# Patient Record
Sex: MALE | Race: WHITE | NOT HISPANIC OR LATINO | Employment: OTHER | URBAN - METROPOLITAN AREA
[De-identification: names, ages, dates, MRNs, and addresses within clinical notes are randomized per-mention and may not be internally consistent; named-entity substitution may affect disease eponyms.]

---

## 2023-12-12 ENCOUNTER — HOSPITAL ENCOUNTER (INPATIENT)
Facility: HOSPITAL | Age: 83
LOS: 2 days | Discharge: NURSING, REHAB OR LONG-TERM CARE | DRG: 988 | End: 2023-12-15
Attending: INTERNAL MEDICINE | Admitting: INTERNAL MEDICINE
Payer: MEDICARE

## 2023-12-12 ENCOUNTER — APPOINTMENT (EMERGENCY)
Dept: RADIOLOGY | Facility: HOSPITAL | Age: 83
DRG: 988 | End: 2023-12-12
Payer: MEDICARE

## 2023-12-12 DIAGNOSIS — S61.209A OPEN WOUND OF FINGER, INITIAL ENCOUNTER: Primary | ICD-10-CM

## 2023-12-12 DIAGNOSIS — R09.81 NASAL CONGESTION: ICD-10-CM

## 2023-12-12 DIAGNOSIS — M86.9 OSTEOMYELITIS OF FINGER OF LEFT HAND (HCC): ICD-10-CM

## 2023-12-12 LAB
ALBUMIN SERPL BCP-MCNC: 3.7 G/DL (ref 3.5–5)
ALP SERPL-CCNC: 64 U/L (ref 34–104)
ALT SERPL W P-5'-P-CCNC: 26 U/L (ref 7–52)
ANION GAP SERPL CALCULATED.3IONS-SCNC: 7 MMOL/L
AST SERPL W P-5'-P-CCNC: 26 U/L (ref 13–39)
BASOPHILS # BLD AUTO: 0.03 THOUSANDS/ÂΜL (ref 0–0.1)
BASOPHILS NFR BLD AUTO: 0 % (ref 0–1)
BILIRUB SERPL-MCNC: 0.35 MG/DL (ref 0.2–1)
BUN SERPL-MCNC: 26 MG/DL (ref 5–25)
CALCIUM SERPL-MCNC: 9.5 MG/DL (ref 8.4–10.2)
CHLORIDE SERPL-SCNC: 104 MMOL/L (ref 96–108)
CO2 SERPL-SCNC: 25 MMOL/L (ref 21–32)
CREAT SERPL-MCNC: 1.09 MG/DL (ref 0.6–1.3)
CRP SERPL QL: <1 MG/L
EOSINOPHIL # BLD AUTO: 0.09 THOUSAND/ÂΜL (ref 0–0.61)
EOSINOPHIL NFR BLD AUTO: 1 % (ref 0–6)
ERYTHROCYTE [DISTWIDTH] IN BLOOD BY AUTOMATED COUNT: 13.3 % (ref 11.6–15.1)
ERYTHROCYTE [SEDIMENTATION RATE] IN BLOOD: 29 MM/HOUR (ref 0–19)
GFR SERPL CREATININE-BSD FRML MDRD: 62 ML/MIN/1.73SQ M
GLUCOSE SERPL-MCNC: 96 MG/DL (ref 65–140)
HCT VFR BLD AUTO: 36.2 % (ref 36.5–49.3)
HGB BLD-MCNC: 11.8 G/DL (ref 12–17)
IMM GRANULOCYTES # BLD AUTO: 0.02 THOUSAND/UL (ref 0–0.2)
IMM GRANULOCYTES NFR BLD AUTO: 0 % (ref 0–2)
LYMPHOCYTES # BLD AUTO: 2.27 THOUSANDS/ÂΜL (ref 0.6–4.47)
LYMPHOCYTES NFR BLD AUTO: 30 % (ref 14–44)
MCH RBC QN AUTO: 31.6 PG (ref 26.8–34.3)
MCHC RBC AUTO-ENTMCNC: 32.6 G/DL (ref 31.4–37.4)
MCV RBC AUTO: 97 FL (ref 82–98)
MONOCYTES # BLD AUTO: 0.73 THOUSAND/ÂΜL (ref 0.17–1.22)
MONOCYTES NFR BLD AUTO: 10 % (ref 4–12)
NEUTROPHILS # BLD AUTO: 4.49 THOUSANDS/ÂΜL (ref 1.85–7.62)
NEUTS SEG NFR BLD AUTO: 59 % (ref 43–75)
NRBC BLD AUTO-RTO: 0 /100 WBCS
PLATELET # BLD AUTO: 238 THOUSANDS/UL (ref 149–390)
PMV BLD AUTO: 9.9 FL (ref 8.9–12.7)
POTASSIUM SERPL-SCNC: 4.2 MMOL/L (ref 3.5–5.3)
PROCALCITONIN SERPL-MCNC: <0.05 NG/ML
PROT SERPL-MCNC: 6.6 G/DL (ref 6.4–8.4)
RBC # BLD AUTO: 3.74 MILLION/UL (ref 3.88–5.62)
SODIUM SERPL-SCNC: 136 MMOL/L (ref 135–147)
WBC # BLD AUTO: 7.63 THOUSAND/UL (ref 4.31–10.16)

## 2023-12-12 PROCEDURE — 96365 THER/PROPH/DIAG IV INF INIT: CPT

## 2023-12-12 PROCEDURE — 86140 C-REACTIVE PROTEIN: CPT | Performed by: PHYSICIAN ASSISTANT

## 2023-12-12 PROCEDURE — 84145 PROCALCITONIN (PCT): CPT | Performed by: PHYSICIAN ASSISTANT

## 2023-12-12 PROCEDURE — 96366 THER/PROPH/DIAG IV INF ADDON: CPT

## 2023-12-12 PROCEDURE — 73140 X-RAY EXAM OF FINGER(S): CPT

## 2023-12-12 PROCEDURE — 85025 COMPLETE CBC W/AUTO DIFF WBC: CPT | Performed by: PHYSICIAN ASSISTANT

## 2023-12-12 PROCEDURE — 85652 RBC SED RATE AUTOMATED: CPT | Performed by: PHYSICIAN ASSISTANT

## 2023-12-12 PROCEDURE — 36415 COLL VENOUS BLD VENIPUNCTURE: CPT | Performed by: PHYSICIAN ASSISTANT

## 2023-12-12 PROCEDURE — 99284 EMERGENCY DEPT VISIT MOD MDM: CPT

## 2023-12-12 PROCEDURE — 87040 BLOOD CULTURE FOR BACTERIA: CPT | Performed by: PHYSICIAN ASSISTANT

## 2023-12-12 PROCEDURE — 96367 TX/PROPH/DG ADDL SEQ IV INF: CPT

## 2023-12-12 PROCEDURE — 99285 EMERGENCY DEPT VISIT HI MDM: CPT | Performed by: PHYSICIAN ASSISTANT

## 2023-12-12 PROCEDURE — 80053 COMPREHEN METABOLIC PANEL: CPT | Performed by: PHYSICIAN ASSISTANT

## 2023-12-12 RX ORDER — CEFEPIME HYDROCHLORIDE 2 G/50ML
2000 INJECTION, SOLUTION INTRAVENOUS ONCE
Status: COMPLETED | OUTPATIENT
Start: 2023-12-12 | End: 2023-12-13

## 2023-12-12 RX ORDER — VANCOMYCIN HYDROCHLORIDE 1 G/200ML
1000 INJECTION, SOLUTION INTRAVENOUS ONCE
Status: DISCONTINUED | OUTPATIENT
Start: 2023-12-12 | End: 2023-12-12

## 2023-12-12 RX ADMIN — CEFEPIME HYDROCHLORIDE 2000 MG: 2 INJECTION, SOLUTION INTRAVENOUS at 23:26

## 2023-12-12 RX ADMIN — VANCOMYCIN HYDROCHLORIDE 2000 MG: 1 INJECTION, POWDER, LYOPHILIZED, FOR SOLUTION INTRAVENOUS at 20:19

## 2023-12-12 NOTE — ED PROVIDER NOTES
History  Chief Complaint   Patient presents with    Finger Injury     Pt presents to the ed with after getting left hand caught in bed, per ems lost finer tip, given two rounds of doxy, per ems nursing home did cultures and found infection      Past Medical History:  Abnormality of gait, HTN, muscle weakness, TIA, vitamin D deficiency,, hyperlipidemia, nonspecific abnormality of gait, vascular dementia, Aortic ectasia, unspecified site, TIA, Type 2 diabetes mellitus with CKD, Vascular dementia, moderate, without behavioral disturbance, psychotic disturbance, mood disturbance, and anxiety   PSH none listed    Pt with dementia so does not provide good, complete history, history obtained from nurse Scooter Hadley at [facility] who states patient sustained a puncture injury on 11-20 when he was tucking in the sheets and punctured his left middle finger on a screw. They have been debriding and having wound care manage wound, on 12/8 had x-rays: "That showed tiny bony erosion to the tuft of middle finger concerning for osteomyelitis recommended MRI" which was ordered , and wound cultures results below patient was given Doxy which shows some resistance  Tonight they change dressing" tip of the finger fell off" so was referred to emergency department.   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 No pertinent surgical history. History reviewed. No pertinent family history. I have reviewed and agree with the history as documented. E-Cigarette/Vaping     E-Cigarette/Vaping Substances     Social History     Tobacco Use    Smoking status: Unknown       Review of Systems   Unable to perform ROS: Dementia (Limited due to dementia)   Constitutional:  Negative for fever. Gastrointestinal:  Negative for vomiting. Musculoskeletal:  Positive for arthralgias, joint swelling and myalgias. Skin:  Positive for color change and wound. Neurological:  Positive for numbness. All other systems reviewed and are negative. Physical Exam  Physical Exam  Vitals and nursing note reviewed. Constitutional:       General: He is not in acute distress. Appearance: He is well-developed. HENT:      Head: Normocephalic. Right Ear: External ear normal.      Left Ear: External ear normal.      Nose: Nose normal.      Mouth/Throat:      Mouth: Mucous membranes are moist.      Pharynx: Oropharynx is clear. Eyes:      Conjunctiva/sclera: Conjunctivae normal.   Cardiovascular:      Rate and Rhythm: Normal rate. Pulmonary:      Effort: Pulmonary effort is normal.   Musculoskeletal:         General: Tenderness, deformity and signs of injury present. Normal range of motion. Cervical back: Normal range of motion. Comments: Left middle digit: Open wound noted at tip draining serous discharge, chronic wound changes, diffuse erythema, no tenderness, partially avulsed nailbed and tip, no active bleeding no obvious foreign body (see pic)   Skin:     General: Skin is warm and dry. Findings: Erythema present. Neurological:      Mental Status: He is alert and oriented to person, place, and time.    Psychiatric:         Behavior: Behavior normal.                   Vital Signs  ED Triage Vitals   Temperature Pulse Respirations Blood Pressure SpO2   12/12/23 1809 12/12/23 1809 12/12/23 1809 12/12/23 1819 12/12/23 1809   (!) 97.4 °F (36.3 °C) 65 20 147/68 98 %      Temp Source Heart Rate Source Patient Position - Orthostatic VS BP Location FiO2 (%)   12/12/23 1809 12/12/23 1809 -- -- --   Oral Monitor         Pain Score       --                  Vitals:    12/12/23 1809 12/12/23 1819   BP:  147/68   Pulse: 65          Visual Acuity      ED Medications  Medications   cefepime (MAXIPIME) IVPB (premix in dextrose) 2,000 mg 50 mL (has no administration in time range)   vancomycin (VANCOCIN) 2,000 mg in sodium chloride 0.9 % 500 mL IVPB (2,000 mg Intravenous New Bag 12/12/23 2019)       Diagnostic Studies  Results Reviewed       Procedure Component Value Units Date/Time    Procalcitonin [[id number]]  (Normal) Collected: 12/12/23 1846    Lab Status: Final result Specimen: Blood from Arm, Right Updated: 12/12/23 1936     Procalcitonin <0.05 ng/ml     Blood culture #1 [[id number]]     Lab Status: No result Specimen: Blood     Blood culture #2 [[id number]]     Lab Status: No result Specimen: Blood     Comprehensive metabolic panel [[id number]]  (Abnormal) Collected: 12/12/23 1846    Lab Status: Final result Specimen: Blood from Arm, Right Updated: 12/12/23 1911     Sodium 136 mmol/L      Potassium 4.2 mmol/L      Chloride 104 mmol/L      CO2 25 mmol/L      ANION GAP 7 mmol/L      BUN 26 mg/dL      Creatinine 1.09 mg/dL      Glucose 96 mg/dL      Calcium 9.5 mg/dL      AST 26 U/L      ALT 26 U/L      Alkaline Phosphatase 64 U/L      Total Protein 6.6 g/dL      Albumin 3.7 g/dL      Total Bilirubin 0.35 mg/dL      eGFR 62 ml/min/1.73sq m     Narrative:      [place] guidelines for Chronic Kidney Disease (CKD):     Stage 1 with normal or high GFR (GFR > 90 mL/min/1.73 square meters)    Stage 2 Mild CKD (GFR = 60-89 mL/min/1.73 square meters)    Stage 3A Moderate CKD (GFR = 45-59 mL/min/1.73 square meters)    Stage 3B Moderate CKD (GFR = 30-44 mL/min/1.73 square meters)    Stage 4 Severe CKD (GFR = 15-29 mL/min/1.73 square meters)    Stage 5 End Stage CKD (GFR <15 mL/min/1.73 square meters)  Note: GFR calculation is accurate only with a steady state creatinine    C-reactive protein [[id number]]  (Normal) Collected: 12/12/23 1846    Lab Status: Final result Specimen: Blood from Arm, Right Updated: 12/12/23 1911     CRP <1.0 mg/L     Sedimentation rate, automated [[id number]]  (Abnormal) Collected: 12/12/23 1846    Lab Status: Final result Specimen: Blood from Arm, Right Updated: 12/12/23 1904     Sed Rate 29 mm/hour     CBC and differential [[id number]]  (Abnormal) Collected: 12/12/23 1846    Lab Status: Final result Specimen: Blood from Arm, Right Updated: 12/12/23 1853     WBC 7.63 Thousand/uL      RBC 3.74 Million/uL      Hemoglobin 11.8 g/dL      Hematocrit 36.2 %      MCV 97 fL      MCH 31.6 pg      MCHC 32.6 g/dL      RDW 13.3 %      MPV 9.9 fL      Platelets 010 Thousands/uL      nRBC 0 /100 WBCs      Neutrophils Relative 59 %      Immat GRANS % 0 %      Lymphocytes Relative 30 %      Monocytes Relative 10 %      Eosinophils Relative 1 %      Basophils Relative 0 %      Neutrophils Absolute 4.49 Thousands/µL      Immature Grans Absolute 0.02 Thousand/uL      Lymphocytes Absolute 2.27 Thousands/µL      Monocytes Absolute 0.73 Thousand/µL      Eosinophils Absolute 0.09 Thousand/µL      Basophils Absolute 0.03 Thousands/µL                    XR finger third digit-middle LEFT   ED Interpretation by Cristy Valencia PA-C (12/12 1846)   Erosion to distal phalanx                 Procedures  Procedures         ED Course  ED Course as of 12/12/23 2042   Tue Dec 12, 2023   2012 Sed Rate(!): 29  Elevated, will monitor osteo   2012 Cmp unremarkable                               SBIRT 22yo+      Flowsheet Row Most Recent Value   Initial Alcohol Screen: US AUDIT-C     1. How often do you have a drink containing alcohol? 0 Filed at: 12/12/2023 1817   2.  How many drinks containing alcohol do you have on a typical day you are drinking? 0 Filed at: 12/12/2023 1817   3a. Male UNDER 65: How often do you have five or more drinks on one occasion? 0 Filed at: 12/12/2023 1817   3b. FEMALE Any Age, or MALE 65+: How often do you have 4 or more drinks on one occassion? 0 Filed at: 12/12/2023 1817   Audit-C Score 0 Filed at: 12/12/2023 3401   ILYA: How many times in the past year have you. .. Used an illegal drug or used a prescription medication for non-medical reasons? Never Filed at: 12/12/2023 1817                      Medical Decision Making  Patient with prior puncture wound, chronic injury, open wound with suspicion for osteomyelitis. Wound cleaned, Betadine paint, bulky gauze dressing placed  Labs ordered spoke to hand who recommends labs, transfer to [facility] ED for evaluation and possible OR debridement, NPO at midnight  Spoke to [facility] who advised the patient may be a hold but will be put on the OR schedule for possibly tomorrow  Care turned over to attending, pending transfer to [address] [place] [address] and/or Complexity of Data Reviewed  External Data Reviewed: labs. Details: Patient presents with wound culture results from 10-8 that show few providencia stuartii, many beta-hemolytic strep group A many Staph aureus, many diphtheria's Corynebacterium species  Both susceptible to Bactrim resistant to Doxy  Labs: ordered. Decision-making details documented in ED Course. Radiology: ordered and independent interpretation performed. Decision-making details documented in ED Course. Discussion of management or test interpretation with external provider(s): TT hand: Dr Love Byrd, requests: "Please transfer to [facility] ED. NPO midnight. We will take a look tomorrow. Will place on books to hold spot."  labs  PAC called back, may be hold. Spoke to AP, Amanda Devi, accepts patient  under Dr. Vladimir Cuellar drug management. Decision regarding hospitalization. Disposition  Final diagnoses:   Open wound of finger, initial encounter   Osteomyelitis of finger of left hand ([address]) - middle     Time reflects when diagnosis was documented in both MDM as applicable and the Disposition within this note       Time User Action Codes Description Comment    12/12/2023  7:21 PM Althea Verma Add [V44.295J] Open wound of finger, initial encounter     12/12/2023  7:21 PM Althea Verma Add [M86.9] Osteomyelitis of finger of left hand ([address])     12/12/2023  7:22 PM Althea Verma Modify [M86.9] Osteomyelitis of finger of left hand [facility]) middle          ED Disposition       ED Disposition   Transfer to Another Facility-In Network    Condition   --    Date/Time   Tue Dec 12, 2023 2002 [facility] should be transferred out to [facility]. Follow-up Information    None         Patient's Medications    No medications on file       No discharge procedures on file.     PDMP Review       None            ED Provider  Electronically Signed by             Kevon Mansfield PA-C  12/12/23 2043

## 2023-12-13 ENCOUNTER — ANESTHESIA EVENT (INPATIENT)
Dept: PERIOP | Facility: HOSPITAL | Age: 83
DRG: 988 | End: 2023-12-13
Payer: MEDICARE

## 2023-12-13 ENCOUNTER — ANESTHESIA (INPATIENT)
Dept: PERIOP | Facility: HOSPITAL | Age: 83
DRG: 988 | End: 2023-12-13
Payer: MEDICARE

## 2023-12-13 PROBLEM — M86.9 OSTEOMYELITIS OF FINGER OF LEFT HAND (HCC): Status: ACTIVE | Noted: 2023-12-13

## 2023-12-13 PROBLEM — G45.9 TIA (TRANSIENT ISCHEMIC ATTACK): Status: ACTIVE | Noted: 2023-12-13

## 2023-12-13 PROBLEM — E11.9 TYPE 2 DIABETES MELLITUS (HCC): Status: ACTIVE | Noted: 2023-12-13

## 2023-12-13 PROBLEM — F03.90 DEMENTIA (HCC): Status: ACTIVE | Noted: 2023-12-13

## 2023-12-13 LAB
ALBUMIN SERPL BCP-MCNC: 3.6 G/DL (ref 3.5–5)
ALP SERPL-CCNC: 49 U/L (ref 34–104)
ALT SERPL W P-5'-P-CCNC: 26 U/L (ref 7–52)
ANION GAP SERPL CALCULATED.3IONS-SCNC: 7 MMOL/L
AST SERPL W P-5'-P-CCNC: 28 U/L (ref 13–39)
BASOPHILS # BLD AUTO: 0.04 THOUSANDS/ÂΜL (ref 0–0.1)
BASOPHILS NFR BLD AUTO: 1 % (ref 0–1)
BILIRUB SERPL-MCNC: 0.64 MG/DL (ref 0.2–1)
BUN SERPL-MCNC: 19 MG/DL (ref 5–25)
CALCIUM SERPL-MCNC: 9.1 MG/DL (ref 8.4–10.2)
CHLORIDE SERPL-SCNC: 106 MMOL/L (ref 96–108)
CO2 SERPL-SCNC: 25 MMOL/L (ref 21–32)
CREAT SERPL-MCNC: 1 MG/DL (ref 0.6–1.3)
EOSINOPHIL # BLD AUTO: 0.06 THOUSAND/ÂΜL (ref 0–0.61)
EOSINOPHIL NFR BLD AUTO: 1 % (ref 0–6)
ERYTHROCYTE [DISTWIDTH] IN BLOOD BY AUTOMATED COUNT: 13.2 % (ref 11.6–15.1)
GFR SERPL CREATININE-BSD FRML MDRD: 69 ML/MIN/1.73SQ M
GLUCOSE SERPL-MCNC: 102 MG/DL (ref 65–140)
GLUCOSE SERPL-MCNC: 106 MG/DL (ref 65–140)
GLUCOSE SERPL-MCNC: 115 MG/DL (ref 65–140)
HCT VFR BLD AUTO: 37 % (ref 36.5–49.3)
HGB BLD-MCNC: 12 G/DL (ref 12–17)
IMM GRANULOCYTES # BLD AUTO: 0.01 THOUSAND/UL (ref 0–0.2)
IMM GRANULOCYTES NFR BLD AUTO: 0 % (ref 0–2)
INR PPP: 1.16 (ref 0.84–1.19)
LYMPHOCYTES # BLD AUTO: 1.32 THOUSANDS/ÂΜL (ref 0.6–4.47)
LYMPHOCYTES NFR BLD AUTO: 20 % (ref 14–44)
MCH RBC QN AUTO: 31.4 PG (ref 26.8–34.3)
MCHC RBC AUTO-ENTMCNC: 32.4 G/DL (ref 31.4–37.4)
MCV RBC AUTO: 97 FL (ref 82–98)
MONOCYTES # BLD AUTO: 0.55 THOUSAND/ÂΜL (ref 0.17–1.22)
MONOCYTES NFR BLD AUTO: 8 % (ref 4–12)
NEUTROPHILS # BLD AUTO: 4.72 THOUSANDS/ÂΜL (ref 1.85–7.62)
NEUTS SEG NFR BLD AUTO: 70 % (ref 43–75)
NRBC BLD AUTO-RTO: 0 /100 WBCS
PLATELET # BLD AUTO: 251 THOUSANDS/UL (ref 149–390)
PMV BLD AUTO: 10.3 FL (ref 8.9–12.7)
POTASSIUM SERPL-SCNC: 4.2 MMOL/L (ref 3.5–5.3)
PROT SERPL-MCNC: 6.4 G/DL (ref 6.4–8.4)
PROTHROMBIN TIME: 14.7 SECONDS (ref 11.6–14.5)
RBC # BLD AUTO: 3.82 MILLION/UL (ref 3.88–5.62)
SODIUM SERPL-SCNC: 138 MMOL/L (ref 135–147)
VIT B12 SERPL-MCNC: 243 PG/ML (ref 180–914)
WBC # BLD AUTO: 6.7 THOUSAND/UL (ref 4.31–10.16)

## 2023-12-13 PROCEDURE — 85610 PROTHROMBIN TIME: CPT | Performed by: INTERNAL MEDICINE

## 2023-12-13 PROCEDURE — 80053 COMPREHEN METABOLIC PANEL: CPT | Performed by: INTERNAL MEDICINE

## 2023-12-13 PROCEDURE — 87147 CULTURE TYPE IMMUNOLOGIC: CPT | Performed by: STUDENT IN AN ORGANIZED HEALTH CARE EDUCATION/TRAINING PROGRAM

## 2023-12-13 PROCEDURE — 87116 MYCOBACTERIA CULTURE: CPT | Performed by: STUDENT IN AN ORGANIZED HEALTH CARE EDUCATION/TRAINING PROGRAM

## 2023-12-13 PROCEDURE — 87075 CULTR BACTERIA EXCEPT BLOOD: CPT | Performed by: STUDENT IN AN ORGANIZED HEALTH CARE EDUCATION/TRAINING PROGRAM

## 2023-12-13 PROCEDURE — 87206 SMEAR FLUORESCENT/ACID STAI: CPT | Performed by: STUDENT IN AN ORGANIZED HEALTH CARE EDUCATION/TRAINING PROGRAM

## 2023-12-13 PROCEDURE — 99223 1ST HOSP IP/OBS HIGH 75: CPT | Performed by: INTERNAL MEDICINE

## 2023-12-13 PROCEDURE — 85025 COMPLETE CBC W/AUTO DIFF WBC: CPT | Performed by: INTERNAL MEDICINE

## 2023-12-13 PROCEDURE — 87176 TISSUE HOMOGENIZATION CULTR: CPT | Performed by: STUDENT IN AN ORGANIZED HEALTH CARE EDUCATION/TRAINING PROGRAM

## 2023-12-13 PROCEDURE — 99223 1ST HOSP IP/OBS HIGH 75: CPT | Performed by: STUDENT IN AN ORGANIZED HEALTH CARE EDUCATION/TRAINING PROGRAM

## 2023-12-13 PROCEDURE — 82607 VITAMIN B-12: CPT | Performed by: INTERNAL MEDICINE

## 2023-12-13 PROCEDURE — 26951 AMPUTATION OF FINGER/THUMB: CPT | Performed by: STUDENT IN AN ORGANIZED HEALTH CARE EDUCATION/TRAINING PROGRAM

## 2023-12-13 PROCEDURE — 87070 CULTURE OTHR SPECIMN AEROBIC: CPT | Performed by: STUDENT IN AN ORGANIZED HEALTH CARE EDUCATION/TRAINING PROGRAM

## 2023-12-13 PROCEDURE — 82948 REAGENT STRIP/BLOOD GLUCOSE: CPT

## 2023-12-13 PROCEDURE — 0X6R0Z0 DETACHMENT AT LEFT MIDDLE FINGER, COMPLETE, OPEN APPROACH: ICD-10-PCS | Performed by: STUDENT IN AN ORGANIZED HEALTH CARE EDUCATION/TRAINING PROGRAM

## 2023-12-13 PROCEDURE — 87205 SMEAR GRAM STAIN: CPT | Performed by: STUDENT IN AN ORGANIZED HEALTH CARE EDUCATION/TRAINING PROGRAM

## 2023-12-13 RX ORDER — ASPIRIN 81 MG/1
81 TABLET, CHEWABLE ORAL DAILY
Status: DISCONTINUED | OUTPATIENT
Start: 2023-12-14 | End: 2023-12-15 | Stop reason: HOSPADM

## 2023-12-13 RX ORDER — PROPOFOL 10 MG/ML
INJECTION, EMULSION INTRAVENOUS CONTINUOUS PRN
Status: DISCONTINUED | OUTPATIENT
Start: 2023-12-13 | End: 2023-12-13

## 2023-12-13 RX ORDER — PRAVASTATIN SODIUM 80 MG/1
80 TABLET ORAL
Status: DISCONTINUED | OUTPATIENT
Start: 2023-12-13 | End: 2023-12-15 | Stop reason: HOSPADM

## 2023-12-13 RX ORDER — ONDANSETRON 2 MG/ML
4 INJECTION INTRAMUSCULAR; INTRAVENOUS ONCE AS NEEDED
Status: DISCONTINUED | OUTPATIENT
Start: 2023-12-13 | End: 2023-12-13 | Stop reason: HOSPADM

## 2023-12-13 RX ORDER — LIDOCAINE HYDROCHLORIDE 10 MG/ML
INJECTION, SOLUTION EPIDURAL; INFILTRATION; INTRACAUDAL; PERINEURAL AS NEEDED
Status: DISCONTINUED | OUTPATIENT
Start: 2023-12-13 | End: 2023-12-13

## 2023-12-13 RX ORDER — MAGNESIUM HYDROXIDE 1200 MG/15ML
LIQUID ORAL AS NEEDED
Status: DISCONTINUED | OUTPATIENT
Start: 2023-12-13 | End: 2023-12-13 | Stop reason: HOSPADM

## 2023-12-13 RX ORDER — FENTANYL CITRATE/PF 50 MCG/ML
25 SYRINGE (ML) INJECTION
Status: DISCONTINUED | OUTPATIENT
Start: 2023-12-13 | End: 2023-12-13 | Stop reason: HOSPADM

## 2023-12-13 RX ORDER — HYDROMORPHONE HCL IN WATER/PF 6 MG/30 ML
0.2 PATIENT CONTROLLED ANALGESIA SYRINGE INTRAVENOUS
Status: DISCONTINUED | OUTPATIENT
Start: 2023-12-13 | End: 2023-12-13 | Stop reason: HOSPADM

## 2023-12-13 RX ORDER — LIDOCAINE HYDROCHLORIDE 10 MG/ML
INJECTION, SOLUTION EPIDURAL; INFILTRATION; INTRACAUDAL; PERINEURAL AS NEEDED
Status: DISCONTINUED | OUTPATIENT
Start: 2023-12-13 | End: 2023-12-13 | Stop reason: HOSPADM

## 2023-12-13 RX ORDER — CEFAZOLIN SODIUM 2 G/50ML
2000 SOLUTION INTRAVENOUS ONCE
Status: DISCONTINUED | OUTPATIENT
Start: 2023-12-13 | End: 2023-12-13

## 2023-12-13 RX ORDER — SODIUM CHLORIDE, SODIUM GLUCONATE, SODIUM ACETATE, POTASSIUM CHLORIDE, MAGNESIUM CHLORIDE, SODIUM PHOSPHATE, DIBASIC, AND POTASSIUM PHOSPHATE .53; .5; .37; .037; .03; .012; .00082 G/100ML; G/100ML; G/100ML; G/100ML; G/100ML; G/100ML; G/100ML
100 INJECTION, SOLUTION INTRAVENOUS CONTINUOUS
Status: DISPENSED | OUTPATIENT
Start: 2023-12-13 | End: 2023-12-14

## 2023-12-13 RX ORDER — SODIUM CHLORIDE, SODIUM LACTATE, POTASSIUM CHLORIDE, CALCIUM CHLORIDE 600; 310; 30; 20 MG/100ML; MG/100ML; MG/100ML; MG/100ML
INJECTION, SOLUTION INTRAVENOUS CONTINUOUS PRN
Status: DISCONTINUED | OUTPATIENT
Start: 2023-12-13 | End: 2023-12-13

## 2023-12-13 RX ORDER — PROPOFOL 10 MG/ML
INJECTION, EMULSION INTRAVENOUS AS NEEDED
Status: DISCONTINUED | OUTPATIENT
Start: 2023-12-13 | End: 2023-12-13

## 2023-12-13 RX ORDER — OLANZAPINE 10 MG/2ML
2.5 INJECTION, POWDER, FOR SOLUTION INTRAMUSCULAR ONCE
Status: COMPLETED | OUTPATIENT
Start: 2023-12-13 | End: 2023-12-13

## 2023-12-13 RX ORDER — CHLORHEXIDINE GLUCONATE ORAL RINSE 1.2 MG/ML
15 SOLUTION DENTAL ONCE
Status: DISCONTINUED | OUTPATIENT
Start: 2023-12-13 | End: 2023-12-14

## 2023-12-13 RX ORDER — INSULIN LISPRO 100 [IU]/ML
1-5 INJECTION, SOLUTION INTRAVENOUS; SUBCUTANEOUS
Status: DISCONTINUED | OUTPATIENT
Start: 2023-12-13 | End: 2023-12-15 | Stop reason: HOSPADM

## 2023-12-13 RX ORDER — MEMANTINE HYDROCHLORIDE 5 MG/1
5 TABLET ORAL
Status: DISCONTINUED | OUTPATIENT
Start: 2023-12-14 | End: 2023-12-15 | Stop reason: HOSPADM

## 2023-12-13 RX ORDER — EPHEDRINE SULFATE 50 MG/ML
INJECTION INTRAVENOUS AS NEEDED
Status: DISCONTINUED | OUTPATIENT
Start: 2023-12-13 | End: 2023-12-13

## 2023-12-13 RX ORDER — CEFAZOLIN SODIUM 1 G/3ML
INJECTION, POWDER, FOR SOLUTION INTRAMUSCULAR; INTRAVENOUS AS NEEDED
Status: DISCONTINUED | OUTPATIENT
Start: 2023-12-13 | End: 2023-12-13

## 2023-12-13 RX ORDER — AMOXICILLIN AND CLAVULANATE POTASSIUM 875; 125 MG/1; MG/1
1 TABLET, FILM COATED ORAL EVERY 12 HOURS SCHEDULED
Status: DISCONTINUED | OUTPATIENT
Start: 2023-12-13 | End: 2023-12-15 | Stop reason: HOSPADM

## 2023-12-13 RX ADMIN — CEFAZOLIN 1000 MG: 1 INJECTION, POWDER, FOR SOLUTION INTRAMUSCULAR; INTRAVENOUS at 14:33

## 2023-12-13 RX ADMIN — EPHEDRINE SULFATE 10 MG: 50 INJECTION, SOLUTION INTRAVENOUS at 14:45

## 2023-12-13 RX ADMIN — SERTRALINE HYDROCHLORIDE 50 MG: 50 TABLET ORAL at 21:18

## 2023-12-13 RX ADMIN — EPHEDRINE SULFATE 10 MG: 50 INJECTION, SOLUTION INTRAVENOUS at 15:01

## 2023-12-13 RX ADMIN — SODIUM CHLORIDE, SODIUM GLUCONATE, SODIUM ACETATE, POTASSIUM CHLORIDE, MAGNESIUM CHLORIDE, SODIUM PHOSPHATE, DIBASIC, AND POTASSIUM PHOSPHATE 100 ML/HR: .53; .5; .37; .037; .03; .012; .00082 INJECTION, SOLUTION INTRAVENOUS at 15:56

## 2023-12-13 RX ADMIN — SODIUM CHLORIDE, SODIUM LACTATE, POTASSIUM CHLORIDE, AND CALCIUM CHLORIDE: .6; .31; .03; .02 INJECTION, SOLUTION INTRAVENOUS at 14:23

## 2023-12-13 RX ADMIN — OLANZAPINE 2.5 MG: 10 INJECTION, POWDER, FOR SOLUTION INTRAMUSCULAR at 17:10

## 2023-12-13 RX ADMIN — EPHEDRINE SULFATE 10 MG: 50 INJECTION, SOLUTION INTRAVENOUS at 14:56

## 2023-12-13 RX ADMIN — PROPOFOL 20 MG: 10 INJECTION, EMULSION INTRAVENOUS at 14:38

## 2023-12-13 RX ADMIN — PROPOFOL 40 MG: 10 INJECTION, EMULSION INTRAVENOUS at 14:35

## 2023-12-13 RX ADMIN — LIDOCAINE HYDROCHLORIDE 50 MG: 10 INJECTION, SOLUTION EPIDURAL; INFILTRATION; INTRACAUDAL; PERINEURAL at 14:35

## 2023-12-13 RX ADMIN — PROPOFOL 80 MCG/KG/MIN: 10 INJECTION, EMULSION INTRAVENOUS at 14:35

## 2023-12-13 RX ADMIN — AMOXICILLIN AND CLAVULANATE POTASSIUM 1 TABLET: 875; 125 TABLET, FILM COATED ORAL at 21:18

## 2023-12-13 NOTE — DISCHARGE INSTR - AVS FIRST PAGE
Post Operative Instructions    You have had surgery on your arm today, please read and follow the information below:  Elevate your hand above your elbow during the next 24-48 hours to help with swelling. Place your hand and arm over your head with motion at your shoulder three times a day. Do not apply any cream/ointment/oil to your incisions including antibiotics (I.e.Neosporin)  Do not use peroxide to clean your wound   Do not soak your hands in standing water (dishwater, tubs, Jacuzzi's, pools, etc.) until given permission (typically 2-3 weeks after injury)    Call the office if you notice any:  Increased numbness or tingling of your hand or fingers that is not relieved with elevation. Increasing pain that is not controlled with medication. Difficulty chewing, breathing, swallowing. Chest pains or shortness of breath. Fever over 101.4 degrees. Bandage: Do NOT remove bandage until follow-up appointment. If bandage falls off or becomes wet/dirty, please replace right away. Motion: Move fingers into a fist 5 times a day, DO NOT move any splinted fingers. Weight bearing status: Avoid heavy lifting (>2 pounds) with the extremity that was operated on until follow up appointment. Normal activities of daily living are OK. Ice: Ice for 10 minutes every hour as needed for swelling x 24 hours. Sling: No sling necessary. Pain medication:   Approved medication as needed for pain. Antibiotics:  Please complete your course of Augmentin x 7 days. Therapy: No therapy needed at this time. Follow-up Appointment: Please make an appointment with Dr. Daphne Rosales office for Tuesday 12/19/23 at our [facility] office. Please call the office if you have any questions or concerns regarding your post-operative care.

## 2023-12-13 NOTE — CASE MANAGEMENT
Case Management Assessment & Discharge Planning Note    Patient name Blanca Carrasco  Location [address] MRN [id number]  : 1940 Date 2023       Current Admission Date: 2023  Current Admission Diagnosis:Osteomyelitis of finger of left hand [facility])   Patient Active Problem List    Diagnosis Date Noted    Osteomyelitis of finger of left hand ([address]) 2023    TIA (transient ischemic attack) 2023    Dementia ([address]) 2023    Type 2 diabetes mellitus ([address]) 2023      LOS (days): 0  Geometric Mean LOS (GMLOS) (days): 6.4  Days to GMLOS:6.1     OBJECTIVE:    Risk of Unplanned Readmission Score: 6.1         Current admission status: Inpatient       Preferred Pharmacy:   [address] [place] [address] - [address]  [address]  [place] [address]  Phone: [phone] Fax: [phone]    Primary Care Provider: Farhad Kwok MD    Primary Insurance: MEDICARE  Secondary Insurance: [facility]:  Active Health Care Proxies    There are no active Health Care Proxies on file. Readmission Root Cause  30 Day Readmission: No    Patient Information  Admitted from[de-identified] Facility [facility]  Mental Status: Confused  During Assessment patient was accompanied by: Not accompanied during assessment  Assessment information provided by[de-identified] Son  Support Systems: Son  Home entry access options.  Select all that apply.: No steps to enter home  Type of Current Residence: Facility  Upon entering residence, is there a bedroom on the main floor (no further steps)?: Yes  Upon entering residence, is there a bathroom on the main floor (no further steps)?: Yes  Living Arrangements: Other (Comment)    Activities of Daily Living Prior to Admission  Functional Status: Assistance  Completes ADLs independently?: No  Level of ADL dependence: Assistance  Ambulates independently?: Yes  Does patient use assisted devices?: Yes  Assisted Devices (DME) used: Summer Smith  Does patient currently own DME?: No  Does patient have a history of Outpatient Therapy (PT/OT)?: No  Does the patient have a history of Short-Term Rehab?: Yes  Does patient have a history of HHC?: Yes  Does patient currently have [address]?: No         Patient Information Continued  Income Source: Pension/FCI  Does patient have prescription coverage?: Yes  Does patient receive dialysis treatments?: No  Does patient have a history of substance abuse?: No  Does patient have a history of Mental Health Diagnosis?: No         Means of Transportation  Means of Transport to Appts[de-identified] Family transport      Housing Stability: Patient Unable To Answer (12/13/2023)    Housing Stability Vital Sign     Unable to Pay for Housing in the Last Year: Patient unable to answer     Number of [address] in the Last Year: 1     Unstable Housing in the Last Year: Patient unable to answer   Food Insecurity: Patient Unable To Answer (12/13/2023)    Hunger Vital Sign     Worried About [place] in the Last Year: Patient unable to answer     [address] in the Last Year: Patient unable to answer   Transportation Needs: Patient Unable To Answer (12/13/2023)    [facility] - Transportation     Lack of Transportation (Medical): Patient unable to answer     Lack of Transportation (Non-Medical): Patient unable to answer   Utilities: Patient Unable To Answer (12/13/2023)    [facility] Utilities     Threatened with loss of utilities: Patient unable to answer       DISCHARGE DETAILS:    Discharge planning discussed with[de-identified] patient's son  Freedom of Choice: Yes  Comments - Freedom of Choice: Cm contacted patient's son to review role of Cm. Son in agreement [facility] referral back to [facility] [place] ([place]). Cm placed referral. CM awaiting medically clearance.   CM contacted family/caregiver?: Yes  Were Treatment Team discharge recommendations reviewed with patient/caregiver?: Yes  Did patient/caregiver verbalize understanding of patient care needs?: N/A- going to facility  Were patient/caregiver advised of the risks associated with not following Treatment Team discharge recommendations?: Yes    Contacts  Patient Contacts: Filiberto Allen  Relationship to Patient[de-identified] Family  Contact Method: Phone  Reason/Outcome: Emergency Contact, Discharge [address]         Is the patient interested in [facility] at discharge?: No    DME Referral Provided  Referral made for DME?: No    Other Referral/Resources/Interventions Provided:  Referral Comments: referral back to [facility]          Treatment Team Recommendation: Facility Return  Discharge Destination Plan[de-identified] Facility Return

## 2023-12-13 NOTE — H&P
[address]  H&P  Name: Jason Torres 81 y.o. male I MRN: [id number]  Unit/Bed#: OR POOL I Date of Admission: 12/12/2023   Date of Service: 12/13/2023 I Hospital Day: 0      Assessment/Plan   Type 2 diabetes mellitus ([address])  Assessment & Plan  No results found for: "HGBA1C"    Recent Labs     12/13/23  1423   POCGLU 106       Blood Sugar Average: Last 72 hrs:  (P) 106    History of type 2 diabetes on oral medications only not on insulin while continue sliding scale and checks with meals at bedtime    Dementia ([address])  Assessment & Plan  Patient with history of dementia baseline orientation to self  Continue Namenda although questionable utility at this stage of dementia  Frequent reorientation delirium precautions    TIA (transient ischemic attack)  Assessment & Plan  Patient with history of TIA, continue aspirin    * Osteomyelitis of finger of left hand ([address])  Assessment & Plan  Patient presenting with wound on his finger consistent with osteomyelitis development  Patient pending OR with orthopedics for amputation  Blood cultures currently negative, anticipate surgical cure and several days of oral antibiotics for completeness               VTE Pharmacologic Prophylaxis: VTE Score: 0 High Risk (Score >/= 5) - Pharmacological DVT Prophylaxis Ordered: heparin. Sequential Compression Devices Ordered. Code Status: Level 3 - DNAR and DNI   Discussion with family: Updated  (son) via phone. Anticipated Length of Stay: Patient will be admitted on an inpatient basis with an anticipated length of stay of greater than 2 midnights secondary to osteo. Total Time Spent on Date of Encounter in care of patient: 35 mins.  This time was spent on one or more of the following: performing physical exam; counseling and coordination of care; obtaining or reviewing history; documenting in the medical record; reviewing/ordering tests, medications or procedures; communicating with other healthcare professionals and discussing with patient's family/caregivers. Chief Complaint: osteo    History of Present Illness:  Mike Sanchez is a 81 y.o. male with a PMH of vascular dementia, essential hypertension, CKD stage III, TIA, type 2 diabetes who presents with wound concerning for osteomyelitis. Ordered by his nursing facility due to an open wound with exposed bone concerning for osteomyelitis. Imaging with x-ray concerning for osteomyelitis as well, patient planned for orthopedic intervention with an amputation. Patient at baseline has vascular dementia and is alert and oriented usually to self. 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 No pertinent surgical history. Meds/Allergies:  Prior to Admission medications    Not on File     I have reveiwed home medications using records provided by SNF. Allergies:    Allergies   Allergen Reactions    Pollen Extract Hives       Social History:  Marital Status: Unknown   Occupation:   Patient Pre-hospital Living Situation: [address]  Patient Pre-hospital Level of Mobility: walks  Patient Pre-hospital Diet Restrictions:   Substance Use History:   Social History     Substance and Sexual Activity   Alcohol Use Not Currently     Social History     Tobacco Use   Smoking Status Unknown   Smokeless Tobacco Not on file     Social History     Substance and Sexual Activity   Drug Use Not Currently       Family History:  History reviewed. No pertinent family history. Physical Exam:     Vitals:   Blood Pressure: 155/72 (12/13/23 1351)  Pulse: 61 (12/13/23 1351)  Temperature: 97.9 °F (36.6 °C) (12/13/23 1351)  Temp Source: Temporal (12/13/23 1351)  Respirations: 16 (12/13/23 1351)  Height: 5' 11" (180.3 cm) (12/13/23 0841)  Weight - Scale: 74.6 kg (164 lb 8 oz) (12/13/23 0841)  SpO2: 99 % (12/13/23 1351)    Physical Exam  Vitals and nursing note reviewed. Constitutional:       General: He is not in acute distress. Appearance: He is well-developed. He is not toxic-appearing or diaphoretic. HENT:      Head: Normocephalic and atraumatic. Eyes:      General: No scleral icterus. Conjunctiva/sclera: Conjunctivae normal.   Cardiovascular:      Rate and Rhythm: Normal rate and regular rhythm. Heart sounds: No murmur heard. No friction rub. No gallop. Pulmonary:      Effort: Pulmonary effort is normal. No respiratory distress. Breath sounds: Normal breath sounds. No stridor. No wheezing, rhonchi or rales. Chest:      Chest wall: No tenderness. Abdominal:      General: There is no distension. Palpations: Abdomen is soft. There is no mass. Tenderness: There is no abdominal tenderness. There is no guarding or rebound. Hernia: No hernia is present. Musculoskeletal:         General: No swelling or tenderness. Cervical back: Neck supple. Comments: Left middle finger open wound with exposed bone   Skin:     General: Skin is warm and dry. Capillary Refill: Capillary refill takes less than 2 seconds. Neurological:      Mental Status: He is alert. He is disoriented.       Comments: Oriented to self only   Psychiatric:         Mood and Affect: Mood normal.          Additional Data:     Lab Results:  Results from last 7 days   Lab Units 12/13/23  0952   WBC Thousand/uL 6.70   HEMOGLOBIN g/dL 12.0   HEMATOCRIT % 37.0   PLATELETS Thousands/uL 251   NEUTROS PCT % 70   LYMPHS PCT % 20   MONOS PCT % 8   EOS PCT % 1     Results from last 7 days   Lab Units 12/13/23  0952   SODIUM mmol/L 138   POTASSIUM mmol/L 4.2   CHLORIDE mmol/L 106   CO2 mmol/L 25   BUN mg/dL 19   CREATININE mg/dL 1.00   ANION GAP mmol/L 7   CALCIUM mg/dL 9.1   ALBUMIN g/dL 3.6   TOTAL BILIRUBIN mg/dL 0.64   ALK PHOS U/L 49   ALT U/L 26   AST U/L 28   GLUCOSE RANDOM mg/dL 115     Results from last 7 days   Lab Units 12/13/23  0952   INR  1.16     Results from last 7 days   Lab Units 12/13/23  1423   POC GLUCOSE mg/dl 106         Results from last 7 days   Lab Units 12/12/23  1846   PROCALCITONIN ng/ml <0.05       Lines/Drains:  Invasive Devices       Peripheral Intravenous Line  Duration             Peripheral IV 12/12/23 Right Antecubital <1 day                        Imaging: Reviewed radiology reports from this admission including: xray(s)  XR finger third digit-middle LEFT   ED Interpretation by Suni Gonzales PA-C (12/12 1846)   Erosion to distal phalanx      Final Result by Evelia Guzman MD (12/13 1716)      Loss of all but the proximal aspect of the distal phalanx of the third digit with associated surrounding soft tissue swelling. While some of this is likely posttraumatic in nature, indistinctness of the distal osseous margins are concerning for    osteomyelitis. Workstation performed: [id number]             EKG and Other Studies Reviewed on Admission:   EKG:  reviewed. ** Please Note: This note has been constructed using a voice recognition system.  **

## 2023-12-13 NOTE — ANESTHESIA PREPROCEDURE EVALUATION
Procedure:  AMPUTATION FINGER - LEFT MIDDLE (Left: Hand)    Relevant Problems   ENDO   (+) Type 2 diabetes mellitus (HCC)      NEURO/PSYCH   (+) Dementia (HCC)   (+) TIA (transient ischemic attack)      Other   (+) Osteomyelitis of finger of left hand (HCC)      Past Medical History:   Diagnosis Date    Abnormality of gait     Aortic ectasia, unspecified site (HCC)     Bile duct proliferation     Chronic kidney disease, stage 3a (HCC)     Essential hypertension, benign     Hyperlipemia     Lack of coordination     Muscle weakness (generalized)     Poorly controlled type 2 diabetes mellitus with circulatory disorder (HCC)     TIA (transient ischemic attack)     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)     Vascular dementia, moderate, without behavioral disturbance, psychotic disturbance, mood disturbance, and anxiety (HCC)     Vitamin D deficiency disease      Lab Results   Component Value Date    WBC 6.70 12/13/2023    HGB 12.0 12/13/2023    HCT 37.0 12/13/2023    MCV 97 12/13/2023     12/13/2023         Physical Exam    Airway    Mallampati score: II  TM Distance: >3 FB  Neck ROM: full     Dental   No notable dental hx     Cardiovascular  Rhythm: regular, Rate: normal    Pulmonary   Breath sounds clear to auscultation    Other Findings  Intercisor Distance > 3cm          Anesthesia Plan  ASA Score- 3     Anesthesia Type- IV sedation with anesthesia with ASA Monitors. Additional Monitors:     Airway Plan:     Comment: NPO appropriate. Discussed benefits/risks of monitored anesthetic care which involves providing a dynamic level of mild to deep sedation. Complications include awareness and/or airway obstruction/aspiration which may necessitate conversion to general anesthesia. All questions answered. Patient understands and wishes to proceed. .       Plan Factors-Exercise tolerance (METS): >4 METS. Chart reviewed. EKG reviewed. Existing labs reviewed. Induction-     Postoperative Plan- Plan for postoperative opioid use. Informed Consent- Anesthetic plan and risks discussed with son and healthcare power of . I personally reviewed this patient with the CRNA. Discussed and agreed on the Anesthesia Plan with the CRNA. Jeronimo Pollard

## 2023-12-13 NOTE — ASSESSMENT & PLAN NOTE
No results found for: "HGBA1C"    Recent Labs     12/13/23  1423   POCGLU 106       Blood Sugar Average: Last 72 hrs:  (P) 106    History of type 2 diabetes on oral medications only not on insulin while continue sliding scale and checks with meals at bedtime

## 2023-12-13 NOTE — CONSULTS
Shilpa Pederson 81 y.o. male MRN: [id number]  Unit/Bed#: -01      Chief Complaint:   left middle finger wound    HPI:   81 y.o.male seen in consult this morning who presents to the ER with a wound of his left middle finger. He comes from a memory unit. There is no family or other  in the room with the patient. He is demented and offers minimal history other than the wound to his finger about a month ago. Per the review of the chart from the emergency room who received history from nursing staff at the [facility] facility he injured his left middle finger on 1120 talking in bed sheet. They were changing dressings and had increased drainage and open wound was noticed yesterday. He was brought to the ER and had an x-ray. He was admitted to the medicine service. We were asked to see him due to this wound on his finger. 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 No pertinent surgical history. Family History:  Family history reviewed and non-contributory  History reviewed. No pertinent family history.     Social History:  Social History     Socioeconomic History    Marital status: Unknown     Spouse name: None    Number of children: None    Years of education: None    Highest education level: None   Occupational History    None   Tobacco Use    Smoking status: Unknown    Smokeless tobacco: None   Substance and Sexual Activity    Alcohol use: None    Drug use: None    Sexual activity: None   Other Topics Concern    None   Social History Narrative    None     Social Determinants of Health     Financial Resource Strain: Not on file   Food Insecurity: Not on file   Transportation Needs: Not on file   Physical Activity: Not on file   Stress: Not on file   Social Connections: Not on file   Intimate Partner Violence: Not on file   Housing Stability: Not on file       Allergies: Allergies   Allergen Reactions    Pollen Extract Hives           Labs:  0   Lab Value Date/Time    HCT 36.2 (L) 12/12/2023 1846    HGB 11.8 (L) 12/12/2023 1846    WBC 7.63 12/12/2023 1846    ESR 29 (H) 12/12/2023 1846    CRP <1.0 12/12/2023 1846       Meds:    Current Facility-Administered Medications:     chlorhexidine (PERIDEX) 0.12 % oral rinse 15 mL, 15 mL, Swish & Spit, Once, MARCK Mandel    Blood Culture:   No results found for: "BLOODCX"    Wound Culture:   No results found for: "WOUNDCULT"    Ins and Outs:  I/O last 24 hours: In: 550 [IV Piggyback:550]  Out: -           Physical Exam:   /90 (BP Location: Left arm)   Pulse 71   Temp 97.5 °F (36.4 °C) (Oral)   Resp 20   Ht 5' 11" (1.803 m)   Wt 74.6 kg (164 lb 8 oz)   SpO2 98%   BMI 22.94 kg/m²   Gen: No acute distress, resting comfortably in bed  HEENT: Eyes clear, moist mucus membranes, hearing intact  Respiratory: No audible wheezing or stridor  Cardiovascular: Well Perfused peripherally, 2+ distal pulse  Abdomen: nondistended, no peritoneal signs  Musculoskeletal: left middle finger  Skin: Open wound over the tip of the left middle finger with exposed bone and chronic wound changes.   There is scant serous drainage on the dressing but no active drainage. Swelling about the distal finger  Intact AROM of the finger  None TTP along flexor tendon sheath  No pain with Passive Extension of middle finger  Sensation intact to the middle finger      Radiology:   I personally reviewed the films. X-rays AP/Lateral and oblique views of left middle finger show no fracture however there is absorption of the distal tuft of the distal phalanx    [unfilled]    Assessment:  83 y.o.male with  left wound with exposed bone and suspected osteomyelitis given resorption on x-ray    Plan:   I called discussed the plan with the patient's son Danielle Dangelo.   Given his wound and suspected osteomyelitis on x-ray recommend operative intervention in the form of revision amputation of the left middle finger with irrigation debridement and no necessary procedures  Informed consent was received over the phone from son Danielle Dangelo who is also the power of   N.p.o.  Discussed with Dr. Kamlesh Tobar and we will plan for operative intervnetion today      Joe Schneider PA-C

## 2023-12-13 NOTE — ED CARE HANDOFF
Emergency Department Sign Out Note        Sign out and transfer of care from [facility]. See Separate Emergency Department note. The patient, Birt Hashimoto, was evaluated by the previous provider for chronic left middle finger wound, open, with osteomyelitis. Workup Completed:  Labs and imaging    ED Course / Workup Pending (followup):                                  ED Course as of 12/13/23 0743   Tue Dec 12, 2023   2037 SO: 81 y M. Left middle finger puncture wound 11/20, now with osteo. Tip fell off (most of distal phalanx) during dressing change today. Hand consulted, transfer to [facility]. Possible OR tomorrow. Senthil Wyatt accepts for SLIM. Wed Dec 13, 2023   0715 Update from PACs. Patient initially accepted by Dr. Senthil Wyatt [facility]) on behalf of orthopedic hand surgery, with plan for transfer to [facility]. However, delay in transport/bed availability. There is an OR with availability here at [facility] today, so hand surgery (Dr. Viji Panda) is recommending admission to [facility] hospitalist service, keep the patient n.p.o., and they will book the OR here at [facility] to perform the patient's surgery today without transfer. Procedures  Medical Decision Making  I received this patient in signout from prior provider at change of shift. SO: 81 y M. Left middle finger puncture wound 11/20, now with osteo. Tip fell off (most of distal phalanx) during dressing change today. Hand consulted, transfer to [facility]. Possible OR tomorrow. Senthil Wyatt accepts for SLIM. Update from PACs. Patient initially accepted by Dr. Senthil Wyatt [facility]) on behalf of orthopedic hand surgery, with plan for transfer to [facility]. However, delay in transport/bed availability. There is an OR with availability here at [facility] today, so hand surgery is recommending admission to [facility] hospitalist service, keep the patient n.p.o., and they will book the OR here at [facility] to perform the patient's surgery today without transfer. Case discussed with hospitalist for admission. Amount and/or Complexity of Data Reviewed  Labs: ordered. Radiology: ordered and independent interpretation performed. Risk  Prescription drug management. Decision regarding hospitalization. Disposition  Final diagnoses:   Open wound of finger, initial encounter   Osteomyelitis of finger of left hand ([address]) - middle     Time reflects when diagnosis was documented in both MDM as applicable and the Disposition within this note       Time User Action Codes Description Comment    12/12/2023  7:21 PM Kehinde Rosales Open wound of finger, initial encounter     12/12/2023  7:21 PM Pj Rodney Add [M86.9] Osteomyelitis of finger of left hand ([address])     12/12/2023  7:22 PM Ofelia Gomez [M86.9] Osteomyelitis of finger of left hand [facility]) middle          ED Disposition       ED Disposition   Admit    Condition   Stable    Date/Time   Wed Dec 13, 2023  7:31 AM    Comment   Case was discussed with Dr. Shaan Horn, and the patient's admission status was agreed to be Admission Status: inpatient status to the service of Dr. Kasandra Leos, [facility]. Follow-up Information    None       Patient's Medications    No medications on file     No discharge procedures on file.        ED Provider  Electronically Signed by     Shaun Lilly MD  12/13/23 3439 LOREN Michel MD  12/13/23 0071

## 2023-12-13 NOTE — ED NOTES
Yanira belt applied to pt d/t getting out of bed multiple times without assistance, creating a fall risk. Pt educated multiple times prior to posey belt application to use call bell, but pt unable to remember how to alert staff of needs. Pt given warm blankets and was back to sleep within 10 minutes.        Sophia Mills  12/13/23 0956

## 2023-12-13 NOTE — OP NOTE
OPERATIVE REPORT  PATIENT NAME: Melissa Saab    :  1940  MRN: [id number]  Pt Location: [facility] OR ROOM 03    SURGERY DATE: 2023     Surgeons and Role:     * Sander Talley MD - Primary    Pre-Op Diagnosis Codes:  Open wound of left middle finger  Left middle finger distal phalanx osteomyelitis    Post-Op Diagnosis Codes:  Open wound of left middle finger  Left middle finger distal phalanx osteomyelitis    Procedure(s) (LRB):  AMPUTATION FINGER - LEFT MIDDLE WITH NEURECTOMY    Specimen(s):  ID Type Source Tests Collected by Time Destination   A : Left Middle Finger Distal Fingertip Tissue Finger, Left ANAEROBIC CULTURE AND GRAM STAIN, CULTURE, TISSUE AND GRAM STAIN, WOUND CULTURE, AFB CULTURE WITH STAIN Sander Talley MD 2023 [id number]        Estimated Blood Loss:   Minimal    Drains:  None    Implants:  * No implants in log *    Anesthesia Type:   Choice    Operative Indications:  Patient is a 81 y.o. male that presented with Left middle finger wound. Radiographs were reviewed and consistent with osteomyelitis. Given these findings, we discussed treatment options with patient and POA son with irrigation and debridement and antibiotics versus amputation. We discussed risks, benefits, and alternatives to surgery. We discussed risks of pain, bleeding, stiffness, need for therapy, infection, stump sensitivity, need for further surgeries. Patient and son POA elected to proceed with amputation. Informed consent was obtained. Operative Findings:  Left middle finger that was able to be treated with amputation at trans middle phalanx  level     Complications:   None     Procedure and Technique:  Patient was identified in the preoperative holding area. Surgical site was marked patient with patient. Patient was taken back to the operating theater. Extremity was prepped and draped in typical fashion. Formal timeout was performed confirming site, patient, procedure.   All present were in agreement. Lidocaine 1% was used for digital block. Tourniquet was inflated. Skin was incised. Full-thickness skin flaps were elevated. Extensor mechanism was sharply transected. The radial and ulnar neurovascular bundles were identified. The digital nerves were transected sharply as proximal as possible to allow the nerve to retract. The digital arteries were coagulated with bipolar. The flexor tendon sheath was encountered and entered. The flexor tendons tendons were placed under tension and cut sharply. The DIP joint capsule was released and amputation was completed. The amputated finger was sent for pathologic examination and cultures. The distal 6 mm of middle phalanx was removed using a rongeur. Bone was inspected and palpated and bone edges were smooth. Tissue at this level appeared healthy with no obvious infection. Wound was thoroughly irrigated with 3 L of normal saline. Skin was closed with 4-0 chromic in near simple interrupted fashion. Wound was dressed with telfa, 4 x 4, Girma, finger sock. Patient was taken to PACU in stable condition. I was present for the entire procedure     Postoperative Plan:  Recommend augmentin for 7 days for any residual soft tissue infection. Okay for active range of motion of hand and digits.       Patient Disposition:  PACU         SIGNATURE: Yecenia Becker MD  DATE: December 13, 2023  TIME: 3:24 PM

## 2023-12-13 NOTE — ANESTHESIA POSTPROCEDURE EVALUATION
Post-Op Assessment Note    CV Status:  Stable  Pain Score: 0    Pain management: adequate       Mental Status:  Alert and awake   Hydration Status:  Euvolemic   PONV Controlled:  Controlled   Airway Patency:  Patent     Post Op Vitals Reviewed: Yes      Staff: CRNA               /58 (12/13/23 1520)    Temp 98.3 °F (36.8 °C) (12/13/23 1520)    Pulse 67 (12/13/23 1520)   Resp (!) 10 (12/13/23 1520)    SpO2 97 % (12/13/23 1520)

## 2023-12-13 NOTE — NURSING NOTE
Pt returned from PACU, pt angry and confused.  "Just wants to sleep and be felt alone."   Bed alarm on and activated

## 2023-12-13 NOTE — ASSESSMENT & PLAN NOTE
Patient presenting with wound on his finger consistent with osteomyelitis development  Patient pending OR with orthopedics for amputation  Blood cultures currently negative, anticipate surgical cure and several days of oral antibiotics for completeness

## 2023-12-13 NOTE — NURSING NOTE
Pt confused trying to get OOB, pulled dressing off amputation finger and licked discharge coming from finger. Provider at bedside.

## 2023-12-13 NOTE — ED NOTES
Pt was able to slip under posey belt, and get out of bed, ambulated to commode to urinate. Pt not understanding why he is still here this long. Explained that he will be going to [facility] for surgery. Pt smiled and states "I can't be mad at you people, you're doing great! It's the doctor for surgery that's sitting on his behind that's taking so long!" Education given about how scheduling works. Pt states understanding. Pt now back in bed, re-educated on call bell use, music turned on, pt given blankets and states he is comfortable.      Benito Don, [address]  12/13/23 2021

## 2023-12-13 NOTE — ASSESSMENT & PLAN NOTE
Patient with history of dementia baseline orientation to self  Continue Namenda although questionable utility at this stage of dementia  Frequent reorientation delirium precautions

## 2023-12-13 NOTE — PLAN OF CARE

## 2023-12-14 LAB
ALBUMIN SERPL BCP-MCNC: 3.4 G/DL (ref 3.5–5)
ALP SERPL-CCNC: 49 U/L (ref 34–104)
ALT SERPL W P-5'-P-CCNC: 22 U/L (ref 7–52)
ANION GAP SERPL CALCULATED.3IONS-SCNC: 8 MMOL/L
AST SERPL W P-5'-P-CCNC: 24 U/L (ref 13–39)
BASOPHILS # BLD AUTO: 0.04 THOUSANDS/ÂΜL (ref 0–0.1)
BASOPHILS NFR BLD AUTO: 1 % (ref 0–1)
BILIRUB SERPL-MCNC: 0.55 MG/DL (ref 0.2–1)
BUN SERPL-MCNC: 14 MG/DL (ref 5–25)
CALCIUM ALBUM COR SERPL-MCNC: 9.7 MG/DL (ref 8.3–10.1)
CALCIUM SERPL-MCNC: 9.2 MG/DL (ref 8.4–10.2)
CHLORIDE SERPL-SCNC: 106 MMOL/L (ref 96–108)
CO2 SERPL-SCNC: 26 MMOL/L (ref 21–32)
CREAT SERPL-MCNC: 0.93 MG/DL (ref 0.6–1.3)
EOSINOPHIL # BLD AUTO: 0.05 THOUSAND/ÂΜL (ref 0–0.61)
EOSINOPHIL NFR BLD AUTO: 1 % (ref 0–6)
ERYTHROCYTE [DISTWIDTH] IN BLOOD BY AUTOMATED COUNT: 13.3 % (ref 11.6–15.1)
GFR SERPL CREATININE-BSD FRML MDRD: 76 ML/MIN/1.73SQ M
GLUCOSE SERPL-MCNC: 108 MG/DL (ref 65–140)
GLUCOSE SERPL-MCNC: 122 MG/DL (ref 65–140)
GLUCOSE SERPL-MCNC: 127 MG/DL (ref 65–140)
GLUCOSE SERPL-MCNC: 141 MG/DL (ref 65–140)
GLUCOSE SERPL-MCNC: 99 MG/DL (ref 65–140)
HCT VFR BLD AUTO: 35.6 % (ref 36.5–49.3)
HGB BLD-MCNC: 11.6 G/DL (ref 12–17)
IMM GRANULOCYTES # BLD AUTO: 0.02 THOUSAND/UL (ref 0–0.2)
IMM GRANULOCYTES NFR BLD AUTO: 0 % (ref 0–2)
LYMPHOCYTES # BLD AUTO: 1.44 THOUSANDS/ÂΜL (ref 0.6–4.47)
LYMPHOCYTES NFR BLD AUTO: 19 % (ref 14–44)
MCH RBC QN AUTO: 31.8 PG (ref 26.8–34.3)
MCHC RBC AUTO-ENTMCNC: 32.6 G/DL (ref 31.4–37.4)
MCV RBC AUTO: 98 FL (ref 82–98)
MONOCYTES # BLD AUTO: 0.67 THOUSAND/ÂΜL (ref 0.17–1.22)
MONOCYTES NFR BLD AUTO: 9 % (ref 4–12)
NEUTROPHILS # BLD AUTO: 5.28 THOUSANDS/ÂΜL (ref 1.85–7.62)
NEUTS SEG NFR BLD AUTO: 70 % (ref 43–75)
NRBC BLD AUTO-RTO: 0 /100 WBCS
PLATELET # BLD AUTO: 231 THOUSANDS/UL (ref 149–390)
PMV BLD AUTO: 10.9 FL (ref 8.9–12.7)
POTASSIUM SERPL-SCNC: 3.9 MMOL/L (ref 3.5–5.3)
PROT SERPL-MCNC: 6.1 G/DL (ref 6.4–8.4)
RBC # BLD AUTO: 3.65 MILLION/UL (ref 3.88–5.62)
RHODAMINE-AURAMINE STN SPEC: NORMAL
SODIUM SERPL-SCNC: 140 MMOL/L (ref 135–147)
WBC # BLD AUTO: 7.5 THOUSAND/UL (ref 4.31–10.16)

## 2023-12-14 PROCEDURE — 80053 COMPREHEN METABOLIC PANEL: CPT | Performed by: PHYSICIAN ASSISTANT

## 2023-12-14 PROCEDURE — 82948 REAGENT STRIP/BLOOD GLUCOSE: CPT

## 2023-12-14 PROCEDURE — 99232 SBSQ HOSP IP/OBS MODERATE 35: CPT | Performed by: PHYSICIAN ASSISTANT

## 2023-12-14 PROCEDURE — 99024 POSTOP FOLLOW-UP VISIT: CPT | Performed by: STUDENT IN AN ORGANIZED HEALTH CARE EDUCATION/TRAINING PROGRAM

## 2023-12-14 PROCEDURE — 85025 COMPLETE CBC W/AUTO DIFF WBC: CPT | Performed by: PHYSICIAN ASSISTANT

## 2023-12-14 RX ORDER — ENOXAPARIN SODIUM 100 MG/ML
40 INJECTION SUBCUTANEOUS
Status: DISCONTINUED | OUTPATIENT
Start: 2023-12-14 | End: 2023-12-15 | Stop reason: HOSPADM

## 2023-12-14 RX ADMIN — ASPIRIN 81 MG CHEWABLE TABLET 81 MG: 81 TABLET CHEWABLE at 10:41

## 2023-12-14 RX ADMIN — MEMANTINE 5 MG: 5 TABLET ORAL at 21:00

## 2023-12-14 RX ADMIN — AMOXICILLIN AND CLAVULANATE POTASSIUM 1 TABLET: 875; 125 TABLET, FILM COATED ORAL at 20:46

## 2023-12-14 RX ADMIN — AMOXICILLIN AND CLAVULANATE POTASSIUM 1 TABLET: 875; 125 TABLET, FILM COATED ORAL at 10:41

## 2023-12-14 RX ADMIN — SERTRALINE HYDROCHLORIDE 50 MG: 50 TABLET ORAL at 10:41

## 2023-12-14 RX ADMIN — ENOXAPARIN SODIUM 40 MG: 40 INJECTION SUBCUTANEOUS at 10:43

## 2023-12-14 RX ADMIN — PRAVASTATIN SODIUM 80 MG: 80 TABLET ORAL at 16:40

## 2023-12-14 NOTE — PLAN OF CARE
Problem: Potential for Falls  Goal: Patient will remain free of falls  Description: INTERVENTIONS:  - Educate patient/family on patient safety including physical limitations  - Instruct patient to call for assistance with activity   - Consult OT/PT to assist with strengthening/mobility   - Keep Call bell within reach  - Keep bed low and locked with side rails adjusted as appropriate  - Keep care items and personal belongings within reach  - Initiate and maintain comfort rounds  - Make Fall Risk Sign visible to staff  - Offer Toileting every 4 Hours, in advance of need  - Initiate/Maintain bed alarm  - Obtain necessary fall risk management equipment: walker  - Apply yellow socks and bracelet for high fall risk patients  - Consider moving patient to room near nurses station  Outcome: Progressing     Problem: PAIN - ADULT  Goal: Verbalizes/displays adequate comfort level or baseline comfort level  Description: Interventions:  - Encourage patient to monitor pain and request assistance  - Assess pain using appropriate pain scale  - Administer analgesics based on type and severity of pain and evaluate response  - Implement non-pharmacological measures as appropriate and evaluate response  - Consider cultural and social influences on pain and pain management  - Notify physician/advanced practitioner if interventions unsuccessful or patient reports new pain  Outcome: Progressing     Problem: INFECTION - ADULT  Goal: Absence or prevention of progression during hospitalization  Description: INTERVENTIONS:  - Assess and monitor for signs and symptoms of infection  - Monitor lab/diagnostic results  - Monitor all insertion sites, i.e. indwelling lines, tubes, and drains  - Monitor endotracheal if appropriate and nasal secretions for changes in amount and color  - [place] appropriate cooling/warming therapies per order  - Administer medications as ordered  - Instruct and encourage patient and family to use good hand hygiene technique  - Identify and instruct in appropriate isolation precautions for identified infection/condition  Outcome: Progressing       Problem: SAFETY,RESTRAINT: NV/NON-SELF DESTRUCTIVE BEHAVIOR  Goal: Remains free of harm/injury (restraint for non violent/non self-detsructive behavior)  Description: INTERVENTIONS:  - Instruct patient/family regarding restraint use   - Assess and monitor physiologic and psychological status   - Provide interventions and comfort measures to meet assessed patient needs   - Identify and implement measures to help patient regain control  - Assess readiness for release of restraint   Outcome: Progressing     Problem: SAFETY,RESTRAINT: NV/NON-SELF DESTRUCTIVE BEHAVIOR  Goal: Returns to optimal restraint-free functioning  Description: INTERVENTIONS:  - Assess the patient's behavior and symptoms that indicate continued need for restraint  - Identify and implement measures to help patient regain control  - Assess readiness for release of restraint   Outcome: Progressing

## 2023-12-14 NOTE — PLAN OF CARE
Problem: Potential for Falls  Goal: Patient will remain free of falls  Description: INTERVENTIONS:  - Educate patient/family on patient safety including physical limitations  - Instruct patient to call for assistance with activity   - Consult OT/PT to assist with strengthening/mobility   - Keep Call bell within reach  - Keep bed low and locked with side rails adjusted as appropriate  - Keep care items and personal belongings within reach  - Initiate and maintain comfort rounds  - Make Fall Risk Sign visible to staff  - Offer Toileting every  Hours, in advance of need  - Initiate/Maintain alarm  - Obtain necessary fall risk management equipment:   - Apply yellow socks and bracelet for high fall risk patients  - Consider moving patient to room near nurses station  Outcome: Progressing     Problem: PAIN - ADULT  Goal: Verbalizes/displays adequate comfort level or baseline comfort level  Description: Interventions:  - Encourage patient to monitor pain and request assistance  - Assess pain using appropriate pain scale  - Administer analgesics based on type and severity of pain and evaluate response  - Implement non-pharmacological measures as appropriate and evaluate response  - Consider cultural and social influences on pain and pain management  - Notify physician/advanced practitioner if interventions unsuccessful or patient reports new pain  Outcome: Progressing     Problem: INFECTION - ADULT  Goal: Absence or prevention of progression during hospitalization  Description: INTERVENTIONS:  - Assess and monitor for signs and symptoms of infection  - Monitor lab/diagnostic results  - Monitor all insertion sites, i.e. indwelling lines, tubes, and drains  - Monitor endotracheal if appropriate and nasal secretions for changes in amount and color  - [place] appropriate cooling/warming therapies per order  - Administer medications as ordered  - Instruct and encourage patient and family to use good hand hygiene technique  - Identify and instruct in appropriate isolation precautions for identified infection/condition  Outcome: Progressing  Goal: Absence of fever/infection during neutropenic period  Description: INTERVENTIONS:  - Monitor WBC    Outcome: Progressing     Problem: SAFETY ADULT  Goal: Patient will remain free of falls  Description: INTERVENTIONS:  - Educate patient/family on patient safety including physical limitations  - Instruct patient to call for assistance with activity   - Consult OT/PT to assist with strengthening/mobility   - Keep Call bell within reach  - Keep bed low and locked with side rails adjusted as appropriate  - Keep care items and personal belongings within reach  - Initiate and maintain comfort rounds  - Make Fall Risk Sign visible to staff  - Offer Toileting every  Hours, in advance of need  - Initiate/Maintain alarm  - Obtain necessary fall risk management equipment:   - Apply yellow socks and bracelet for high fall risk patients  - Consider moving patient to room near nurses station  Outcome: Progressing  Goal: Maintain or return to baseline ADL function  Description: INTERVENTIONS:  -  Assess patient's ability to carry out ADLs; assess patient's baseline for ADL function and identify physical deficits which impact ability to perform ADLs (bathing, care of mouth/teeth, toileting, grooming, dressing, etc.)  - Assess/evaluate cause of self-care deficits   - Assess range of motion  - Assess patient's mobility; develop plan if impaired  - Assess patient's need for assistive devices and provide as appropriate  - Encourage maximum independence but intervene and supervise when necessary  - Involve family in performance of ADLs  - Assess for home care needs following discharge   - Consider OT consult to assist with ADL evaluation and planning for discharge  - Provide patient education as appropriate  Outcome: Progressing  Goal: Maintains/Returns to pre admission functional level  Description: INTERVENTIONS:  - Perform AM-PAC 6 Click Basic Mobility/ Daily Activity assessment daily.  - Set and communicate daily mobility goal to care team and patient/family/caregiver. - Collaborate with rehabilitation services on mobility goals if consulted  - Perform Range of Motion  times a day. - Reposition patient every  hours.   - Dangle patient  times a day  - Stand patient  times a day  - Ambulate patient  times a day  - Out of bed to chair  times a day   - Out of bed for meals times a day  - Out of bed for toileting  - Record patient progress and toleration of activity level   Outcome: Progressing     Problem: SAFETY,RESTRAINT: NV/NON-SELF DESTRUCTIVE BEHAVIOR  Goal: Remains free of harm/injury (restraint for non violent/non self-detsructive behavior)  Description: INTERVENTIONS:  - Instruct patient/family regarding restraint use   - Assess and monitor physiologic and psychological status   - Provide interventions and comfort measures to meet assessed patient needs   - Identify and implement measures to help patient regain control  - Assess readiness for release of restraint   Outcome: Progressing  Goal: Returns to optimal restraint-free functioning  Description: INTERVENTIONS:  - Assess the patient's behavior and symptoms that indicate continued need for restraint  - Identify and implement measures to help patient regain control  - Assess readiness for release of restraint   Outcome: Progressing     Problem: Prexisting or High Potential for Compromised Skin Integrity  Goal: Skin integrity is maintained or improved  Description: INTERVENTIONS:  - Identify patients at risk for skin breakdown  - Assess and monitor skin integrity  - Assess and monitor nutrition and hydration status  - Monitor labs   - Assess for incontinence   - Turn and reposition patient  - Assist with mobility/ambulation  - Relieve pressure over bony prominences  - Avoid friction and shearing  - Provide appropriate hygiene as needed including keeping skin clean and dry  - Evaluate need for skin moisturizer/barrier cream  - Collaborate with interdisciplinary team   - Patient/family teaching  - Consider wound care consult   Outcome: Progressing

## 2023-12-14 NOTE — ASSESSMENT & PLAN NOTE
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 Was licking finger post op - will continue local wound care and wrap finger well to avoid patient being able to access.

## 2023-12-14 NOTE — ASSESSMENT & PLAN NOTE
No results found for: "HGBA1C"    Recent Labs     12/13/23  1423 12/13/23  1956 12/14/23  0731   POCGLU 106 102 108         Blood Sugar Average: Last 72 hrs:  (P) 573.3297110117848006    History of type 2 diabetes on oral medications only  Currently on SSI ACHS and accuchecks  If no hyperglycemia will discontinue SSI ACHS/accuchecks to avoid increasing agitation

## 2023-12-14 NOTE — ASSESSMENT & PLAN NOTE
Patient with history of dementia - baseline orientation to self  Continue Namenda  Patient with behavioral disturbances yesterday requiring restraints and IM zyprexa on 12/13 at 1700  Per son no baseline behavioral disturbances   Stop restraints, stop zyprexa  Will use PO agents PRN for agitation  Suspect will do better upon return to facility however at this time needs to be off of restraints x 24 hours to return

## 2023-12-14 NOTE — PROGRESS NOTES
Progress Note - Orthopedics   Birt Hashimoto 81 y.o. male MRN: [id number]  Unit/Bed#: -01      Subjective:    83 y.o.male status post left middle finger partial amputation with irrigation debridement on 12/13/2023 by Dr. Viji Panda . No acute events, no new complaints. He denies pain in the finger. Labs:  0   Lab Value Date/Time    HCT 35.6 (L) 12/14/2023 0459    HCT 37.0 12/13/2023 0952    HCT 36.2 (L) 12/12/2023 1846    HGB 11.6 (L) 12/14/2023 0459    HGB 12.0 12/13/2023 0952    HGB 11.8 (L) 12/12/2023 1846    INR 1.16 12/13/2023 0952    WBC 7.50 12/14/2023 0459    WBC 6.70 12/13/2023 0952    WBC 7.63 12/12/2023 1846    ESR 29 (H) 12/12/2023 1846    CRP <1.0 12/12/2023 1846       Meds:    Current Facility-Administered Medications:     amoxicillin-clavulanate (AUGMENTIN) 875-125 mg per tablet 1 tablet, 1 tablet, Oral, Q12H [facility], Annabella Velasco PA-C, 1 tablet at 12/14/23 1041    aspirin chewable tablet 81 mg, 81 mg, Oral, Daily, Annabella Velasco PA-C, 81 mg at 12/14/23 1041    enoxaparin (LOVENOX) subcutaneous injection 40 mg, 40 mg, Subcutaneous, Q24H [facility], Mariposa Luna PA-C, 40 mg at 12/14/23 1043    insulin lispro (HumaLOG) 100 units/mL subcutaneous injection 1-5 Units, 1-5 Units, Subcutaneous, TID AC **AND** Fingerstick Glucose (POCT), , , TID AC, Annabella Velasco PA-C    memantine (NAMENDA) tablet 5 mg, 5 mg, Oral, HS, Annabella Velasco PA-C    pravastatin (PRAVACHOL) tablet 80 mg, 80 mg, Oral, Daily With Dinner, MARCK Mandel    sertraline (ZOLOFT) tablet 50 mg, 50 mg, Oral, Daily, Annabella Velasco PA-C, 50 mg at 12/14/23 1041    Blood Culture:   Lab Results   Component Value Date    BLOODCX No Growth at 24 hrs. 12/12/2023    BLOODCX No Growth at 24 hrs. 12/12/2023       Wound Culture:   No results found for: "WOUNDCULT"    Ins and Outs:  I/O last 24 hours: In: 600 [P.O.:200;  I.V.:400]  Out: -           Physical:  Vitals:    12/14/23 0728   BP: 163/74   Pulse: 60   Resp: 20   Temp: (!) 96.4 °F (35.8 °C)   SpO2: 96%     Musculoskeletal: left Upper Extremity  Dressing with scant strikethrough over the tip  Dressing not taken down  Positive range of motion of the MCP joint  Motor intact anterior interosseous nerve/posterior interosseous nerve/median/radial/ulnar nerve distributions  2+ radial pulse        Assessment:    83 y.o.male status post left middle finger partial amputation and irrigation debridement on 12/13/2023. Plan:     Will maintain dressing and take this down tomorrow  Keep dressing clean and dry  Continue Augmentin for 7 days postop  Pain control  Hand surgery will follow    Felipe Mendoza PA-C

## 2023-12-14 NOTE — PROGRESS NOTES
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 Was licking finger post op - will continue local wound care and wrap finger well to avoid patient being able to access. Dementia [facility])  Assessment & Plan  Patient with history of dementia - baseline orientation to self  Continue Namenda  Patient with behavioral disturbances yesterday requiring restraints and IM zyprexa on 12/13 at 1700  Per son no baseline behavioral disturbances   Stop restraints, stop zyprexa  Will use PO agents PRN for agitation  Suspect will do better upon return to facility however at this time needs to be off of restraints x 24 hours to return    Type 2 diabetes mellitus [facility])  Assessment & Plan  No results found for: "HGBA1C"    Recent Labs     12/13/23  1423 12/13/23  1956 12/14/23  0731   POCGLU 106 102 108         Blood Sugar Average: Last 72 hrs:  (P) 027.3739033793888024    History of type 2 diabetes on oral medications only  Currently on SSI ACHS and accuchecks  If no hyperglycemia will discontinue SSI ACHS/accuchecks to avoid increasing agitation    TIA (transient ischemic attack)  Assessment & Plan  Patient with history of TIA, continue aspirin         VTE Pharmacologic Prophylaxis: VTE Score: 5 High Risk (Score >/= 5) - Pharmacological DVT Prophylaxis Ordered: enoxaparin (Lovenox). Sequential Compression Devices Ordered.     Mobility:   Basic Mobility Inpatient Raw Score: 18  JH-HLM Goal: 6: Walk 10 steps or more  JH-HLM Achieved: 4: Move to chair/commode  [facility] Goal NOT achieved. Continue with multidisciplinary rounding and encourage appropriate mobility to improve upon [facility] goals. Patient Centered Rounds: I performed bedside rounds with nursing staff today. Discussions with Specialists or Other Care Team Provider: nursing    Education and Discussions with Family / Patient: Updated  (son) via phone. Total Time Spent on Date of Encounter in care of patient:  mins. This time was spent on one or more of the following: performing physical exam; counseling and coordination of care; obtaining or reviewing history; documenting in the medical record; reviewing/ordering tests, medications or procedures; communicating with other healthcare professionals and discussing with patient's family/caregivers. Current Length of Stay: 1 day(s)  Current Patient Status: Inpatient   Certification Statement: The patient will continue to require additional inpatient hospital stay due to awaiting safe discharge plan and improvement in behavioral disturbances  Discharge Plan: Anticipate discharge tomorrow to prior assisted or independent living facility. [facility] [employer]    Code Status: Level 3 - DNAR and DNI    Subjective:   Per RN was licking his finger wound yesterday  Still agitated at times  Hopeful to be able to remove restraints    Objective:     Vitals:   Temp (24hrs), Av.9 °F (36.6 °C), Min:96.4 °F (35.8 °C), Max:98.4 °F (36.9 °C)    Temp:  [96.4 °F (35.8 °C)-98.4 °F (36.9 °C)] 96.4 °F (35.8 °C)  HR:  [58-67] 60  Resp:  [10-20] 20  BP: (128-163)/(58-74) 163/74  SpO2:  [95 %-99 %] 96 %  Body mass index is 21.46 kg/m². Input and Output Summary (last 24 hours): Intake/Output Summary (Last 24 hours) at 2023 1012  Last data filed at 2023 1501  Gross per 24 hour   Intake 400 ml   Output --   Net 400 ml       Physical Exam:   Physical Exam  Vitals and nursing note reviewed.    Constitutional:       General: He is not in acute distress. Appearance: Normal appearance. He is not diaphoretic. Comments: In wrist restraints   HENT:      Head: Normocephalic and atraumatic. Cardiovascular:      Rate and Rhythm: Normal rate and regular rhythm. Pulmonary:      Effort: Pulmonary effort is normal.      Breath sounds: Normal breath sounds. No wheezing or rales. Abdominal:      General: Bowel sounds are normal.      Palpations: Abdomen is soft. Tenderness: There is no abdominal tenderness. There is no guarding. Musculoskeletal:      Right lower leg: No edema. Left lower leg: No edema. Comments: Left middle finger dressed. Dried blood noted around the top of finger. Mild surrounding erythema. Skin:     General: Skin is warm and dry. Neurological:      Mental Status: He is alert. Comments: Oriented to person. Not to place or time. Follows commands intermittnetly.    Psychiatric:      Comments: Screaming "you bastards"          Additional Data:     Labs:  Results from last 7 days   Lab Units 12/14/23  0459   WBC Thousand/uL 7.50   HEMOGLOBIN g/dL 11.6*   HEMATOCRIT % 35.6*   PLATELETS Thousands/uL 231   NEUTROS PCT % 70   LYMPHS PCT % 19   MONOS PCT % 9   EOS PCT % 1     Results from last 7 days   Lab Units 12/14/23  0459   SODIUM mmol/L 140   POTASSIUM mmol/L 3.9   CHLORIDE mmol/L 106   CO2 mmol/L 26   BUN mg/dL 14   CREATININE mg/dL 0.93   ANION GAP mmol/L 8   CALCIUM mg/dL 9.2   ALBUMIN g/dL 3.4*   TOTAL BILIRUBIN mg/dL 0.55   ALK PHOS U/L 49   ALT U/L 22   AST U/L 24   GLUCOSE RANDOM mg/dL 99     Results from last 7 days   Lab Units 12/13/23  0952   INR  1.16     Results from last 7 days   Lab Units 12/14/23  0731 12/13/23  1956 12/13/23  1423   POC GLUCOSE mg/dl 108 102 106         Results from last 7 days   Lab Units 12/12/23  1846   PROCALCITONIN ng/ml <0.05       Lines/Drains:  Invasive Devices       Peripheral Intravenous Line  Duration             Peripheral IV 12/13/23 Distal;Dorsal (posterior); Right Forearm <1 day                          Imaging: Reviewed radiology reports from this admission including: xray(s)    Recent Cultures (last 7 days):   Results from last 7 days   Lab Units 12/13/23  1449 12/12/23 2030   BLOOD CULTURE   --  Received in Microbiology Lab. Culture in Progress. Received in Microbiology Lab. Culture in Progress. GRAM STAIN RESULT  No Polys or Bacteria seen  --        Last 24 Hours Medication List:   Current Facility-Administered Medications   Medication Dose Route Frequency Provider Last Rate    amoxicillin-clavulanate  1 tablet Oral Q12H [address] Annabella Velasco PA-C      aspirin  81 mg Oral Daily Annabella Velasco PA-C      enoxaparin  40 mg Subcutaneous Q24H [address] Mariposa Luna PA-C      insulin lispro  1-5 Units Subcutaneous TID AC Annabella Velasco PA-C      memantine  5 mg Oral HS Annabella Velasco PA-C      pravastatin  80 mg Oral Daily [employer], PA-C      sertraline  50 mg Oral Daily Steven Crowder PA-C          Today, Patient Was Seen By: Lilo Mallory PA-C    **Please Note: This note may have been constructed using a voice recognition system. **

## 2023-12-14 NOTE — UTILIZATION REVIEW
Initial Clinical Review    Unable to submit via [facility] 69. Not giving options for Provider at [facility]    12/12 Treatment/ Care started in ED    Admission: Date/Time/Statement:   Admission Orders (From admission, onward)       Ordered        12/13/23 0752  INPATIENT ADMISSION  Once                          Orders Placed This Encounter   Procedures    INPATIENT ADMISSION     Standing Status:   Standing     Number of Occurrences:   1     Order Specific Question:   Level of Care     Answer:   Med Surg [16]     Order Specific Question:   Estimated length of stay     Answer:   More than 2 Midnights     Order Specific Question:   Certification     Answer:   I certify that inpatient services are medically necessary for this patient for a duration of greater than two midnights. See H&P and MD Progress Notes for additional information about the patient's course of treatment. ED Arrival Information       Expected   -    Arrival   12/12/2023 18:05    Acuity   Urgent              Means of arrival   -    Escorted by   -    Service   Hospitalist    Admission type   Emergency              Arrival complaint   Medical Problem             Chief Complaint   Patient presents with    Finger Injury     Pt presents to the ed with after getting left hand caught in bed, per ems lost finer tip, given two rounds of doxy, per ems nursing home did cultures and found infection         Initial Presentation: 81 y.o. male to ED for Nursing facility for wound concerning for osteomyelitis. Imaging with x-ray concerning for osteomyelitis as well. Planned for orthopedic intervention with an amputation. PMH for Vascular dementia, essential hypertension, CKD stage III, TIA, type 2 diabetes. Admit Inpatient level of care for Osteomyelitis of finger of left hand. Plan for OR with Orthopedics. Bld cultures. 12/13  Orthopedic cons; Left wound with exposed bone and suspected osteomyelitis given resorption on x-ray.  Given his wound and suspected osteomyelitis on x-ray recommend operative intervention in the form of revision amputation of the left middle finger with irrigation debridement and no necessary procedures. NPO.     12/13 OR - S/p AMPUTATION FINGER - LEFT MIDDLE WITH NEURECTOMY   Operative Findings:  Left middle finger that was able to be treated with amputation at trans middle phalanx  level    12/14   Progress notes;  POD #1. Augmentin x 7 days for surrounding soft tissue infection. Pt was licking finger post op - will continue local wound care and wrap finger well to avoid patient being able to access. Suspect will do better upon return to facility however at this time needs to be off of restraints x 24 hours to return. Pt still agitated at times. Date: 12/14    Day 3: Has surpassed a 2nd midnight with active treatments and services, which include Post op care, pain control, restraint free and safe d/c plan.       ED Triage Vitals   Temperature Pulse Respirations Blood Pressure SpO2   12/12/23 1809 12/12/23 1809 12/12/23 1809 12/12/23 1819 12/12/23 1809   (!) 97.4 °F (36.3 °C) 65 20 147/68 98 %      Temp Source Heart Rate Source Patient Position - Orthostatic VS BP Location FiO2 (%)   12/12/23 1809 12/12/23 1809 12/13/23 0841 12/13/23 0841 --   Oral Monitor Lying Left arm       Pain Score       12/13/23 1313       No Pain          Wt Readings from Last 1 Encounters:   12/14/23 69.8 kg (153 lb 14.1 oz)     Additional Vital Signs:   12/14/23 0728 96.4 °F (35.8 °C) Abnormal  60 20 163/74 -- 96 % None (Room air) -- Lying   12/14/23 0018 97.8 °F (36.6 °C) 58 16 144/62 99 97 % None (Room air) -- Lying   12/13/23 1923 -- -- -- -- -- -- None (Room air) -- --   12/13/23 1602 98.3 °F (36.8 °C) 60 15 130/58 -- 95 % None (Room air) -- Lying   12/13/23 1600 98.2 °F (36.8 °C) 67 14 136/63 -- 97 % None (Room air) -- Lying   12/13/23 1535 -- 58 11 Abnormal  129/60 -- 98 % None (Room air) WDL --   12/13/23 1530 98.1 °F (36.7 °C) 61 12 128/64 -- 97 % None (Room air) WDL      Pertinent Labs/Diagnostic Test Results:   XR finger third digit-middle LEFT   ED Interpretation by Joanna Rangel PA-C (12/12 1846)   Erosion to distal phalanx      Final Result by Celia Chilel MD (12/13 1244)      Loss of all but the proximal aspect of the distal phalanx of the third digit with associated surrounding soft tissue swelling. While some of this is likely posttraumatic in nature, indistinctness of the distal osseous margins are concerning for    osteomyelitis.          Workstation performed: [id number]               Results from last 7 days   Lab Units 12/14/23  0459 12/13/23  0952 12/12/23  1846   WBC Thousand/uL 7.50 6.70 7.63   HEMOGLOBIN g/dL 11.6* 12.0 11.8*   HEMATOCRIT % 35.6* 37.0 36.2*   PLATELETS Thousands/uL 231 251 238   NEUTROS ABS Thousands/µL 5.28 4.72 4.49         Results from last 7 days   Lab Units 12/14/23  0459 12/13/23  0952 12/12/23  1846   SODIUM mmol/L 140 138 136   POTASSIUM mmol/L 3.9 4.2 4.2   CHLORIDE mmol/L 106 106 104   CO2 mmol/L 26 25 25   ANION GAP mmol/L 8 7 7   BUN mg/dL 14 19 26*   CREATININE mg/dL 0.93 1.00 1.09   EGFR ml/min/1.73sq m 76 69 62   CALCIUM mg/dL 9.2 9.1 9.5     Results from last 7 days   Lab Units 12/14/23  0459 12/13/23  0952 12/12/23  1846   AST U/L 24 28 26   ALT U/L 22 26 26   ALK PHOS U/L 49 49 64   TOTAL PROTEIN g/dL 6.1* 6.4 6.6   ALBUMIN g/dL 3.4* 3.6 3.7   TOTAL BILIRUBIN mg/dL 0.55 0.64 0.35     Results from last 7 days   Lab Units 12/14/23  1116 12/14/23  0731 12/13/23  1956 12/13/23  1423   POC GLUCOSE mg/dl 127 108 102 106     Results from last 7 days   Lab Units 12/14/23  0459 12/13/23  0952 12/12/23  1846   GLUCOSE RANDOM mg/dL 99 115 96       Results from last 7 days   Lab Units 12/13/23  0952   PROTIME seconds 14.7*   INR  1.16         Results from last 7 days   Lab Units 12/12/23  1846   PROCALCITONIN ng/ml <0.05       Results from last 7 days   Lab Units 12/12/23  1846   CRP mg/L <1.0   SED RATE mm/hour 29* Results from last 7 days   Lab Units 12/13/23  1449 12/12/23 2030   BLOOD CULTURE   --  No Growth at 24 hrs. No Growth at 24 hrs.    GRAM STAIN RESULT  No Polys or Bacteria seen  --        ED Treatment:   Medication Administration from 12/12/2023 1805 to 12/13/2023 0465         Date/Time Order Dose Route Action     12/12/2023 2326 EST cefepime (MAXIPIME) IVPB (premix in dextrose) 2,000 mg 50 mL 2,000 mg Intravenous New Bag     12/12/2023 2019 EST vancomycin (VANCOCIN) 2,000 mg in sodium chloride 0.9 % 500 mL IVPB 2,000 mg Intravenous New Bag          Past Medical History:   Diagnosis Date    Abnormality of gait     Aortic ectasia, unspecified site (HCC)     Bile duct proliferation     Chronic kidney disease, stage 3a (HCC)     Essential hypertension, benign     Hyperlipemia     Lack of coordination     Muscle weakness (generalized)     Poorly controlled type 2 diabetes mellitus with circulatory disorder ([facility])     TIA (transient ischemic attack)     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 male    Admission Orders:  Scheduled Medications:  amoxicillin-clavulanate, 1 tablet, Oral, Q12H [facility]  aspirin, 81 mg, Oral, Daily  enoxaparin, 40 mg, Subcutaneous, Q24H [facility]  insulin lispro, 1-5 Units, Subcutaneous, TID AC  memantine, 5 mg, Oral, HS  pravastatin, 80 mg, Oral, Daily With Dinner  sertraline, 50 mg, Oral, Daily      Continuous IV Infusions:   multi-electrolyte (PLASMALYTE-A/ISOLYTE-S PH 7.4) IV solution  Rate: 100 mL/hr Dose: 100 mL/hr  Freq: Continuous Route: IV  Last Dose: Stopped (12/14/23 0710)  Start: 12/13/23 1245 End: 12/14/23 0155     PRN Meds: None       IP CONSULT TO CASE MANAGEMENT    Network Utilization Review Department  ATTENTION: Please call with any questions or concerns to [phone] and carefully listen to the prompts so that you are directed to the right person. All voicemails are confidential.   For Discharge needs, contact Care Management DC Support Team at [phone] opt. [phone]  Send all requests for admission clinical reviews, approved or denied determinations and any other requests to dedicated fax number below belonging to the campus where the patient is receiving treatment.  List of dedicated fax numbers for the Facilities:  [place] DENIALS (Administrative/Medical Necessity) [phone]   DISCHARGE SUPPORT TEAM (NETWORK) [address] (Maternity/NICU/Pediatrics) [phone]   [address] [place] [address] 1000 [facility] [phone]   1506 [facility] 207 [facility] 5220 [facility] [address] [address] [facility] [address]0 [address] 1300 [facility]  [facility] [phone]

## 2023-12-15 VITALS
WEIGHT: 153.88 LBS | OXYGEN SATURATION: 95 % | BODY MASS INDEX: 21.54 KG/M2 | RESPIRATION RATE: 20 BRPM | TEMPERATURE: 97.8 F | HEART RATE: 65 BPM | DIASTOLIC BLOOD PRESSURE: 46 MMHG | SYSTOLIC BLOOD PRESSURE: 101 MMHG | HEIGHT: 71 IN

## 2023-12-15 PROBLEM — R09.81 NASAL CONGESTION: Status: ACTIVE | Noted: 2023-12-15

## 2023-12-15 LAB
BACTERIA SPEC ANAEROBE CULT: NORMAL
GLUCOSE SERPL-MCNC: 116 MG/DL (ref 65–140)
GLUCOSE SERPL-MCNC: 144 MG/DL (ref 65–140)

## 2023-12-15 PROCEDURE — 97167 OT EVAL HIGH COMPLEX 60 MIN: CPT

## 2023-12-15 PROCEDURE — 82948 REAGENT STRIP/BLOOD GLUCOSE: CPT

## 2023-12-15 PROCEDURE — 97162 PT EVAL MOD COMPLEX 30 MIN: CPT

## 2023-12-15 PROCEDURE — 99239 HOSP IP/OBS DSCHRG MGMT >30: CPT

## 2023-12-15 PROCEDURE — 99024 POSTOP FOLLOW-UP VISIT: CPT | Performed by: STUDENT IN AN ORGANIZED HEALTH CARE EDUCATION/TRAINING PROGRAM

## 2023-12-15 RX ORDER — FLUTICASONE PROPIONATE 50 MCG
1 SPRAY, SUSPENSION (ML) NASAL DAILY
Start: 2023-12-16

## 2023-12-15 RX ORDER — ASPIRIN 81 MG/1
81 TABLET, CHEWABLE ORAL DAILY
COMMUNITY

## 2023-12-15 RX ORDER — SIMVASTATIN 40 MG
40 TABLET ORAL
COMMUNITY

## 2023-12-15 RX ORDER — MELATONIN
1000 DAILY
COMMUNITY

## 2023-12-15 RX ORDER — GUAIFENESIN 600 MG/1
600 TABLET, EXTENDED RELEASE ORAL EVERY 12 HOURS SCHEDULED
Start: 2023-12-15

## 2023-12-15 RX ORDER — LISINOPRIL 30 MG/1
30 TABLET ORAL DAILY
COMMUNITY

## 2023-12-15 RX ORDER — GUAIFENESIN 600 MG/1
600 TABLET, EXTENDED RELEASE ORAL EVERY 12 HOURS SCHEDULED
Status: DISCONTINUED | OUTPATIENT
Start: 2023-12-15 | End: 2023-12-15 | Stop reason: HOSPADM

## 2023-12-15 RX ORDER — AMOXICILLIN AND CLAVULANATE POTASSIUM 875; 125 MG/1; MG/1
1 TABLET, FILM COATED ORAL EVERY 12 HOURS SCHEDULED
Start: 2023-12-15 | End: 2023-12-20

## 2023-12-15 RX ORDER — FLUTICASONE PROPIONATE 50 MCG
1 SPRAY, SUSPENSION (ML) NASAL DAILY
Status: DISCONTINUED | OUTPATIENT
Start: 2023-12-15 | End: 2023-12-15 | Stop reason: HOSPADM

## 2023-12-15 RX ORDER — MEMANTINE HYDROCHLORIDE 5 MG/1
5 TABLET ORAL DAILY
COMMUNITY

## 2023-12-15 RX ADMIN — FLUTICASONE PROPIONATE 1 SPRAY: 50 SPRAY, METERED NASAL at 10:40

## 2023-12-15 RX ADMIN — AMOXICILLIN AND CLAVULANATE POTASSIUM 1 TABLET: 875; 125 TABLET, FILM COATED ORAL at 09:15

## 2023-12-15 RX ADMIN — SERTRALINE HYDROCHLORIDE 50 MG: 50 TABLET ORAL at 09:15

## 2023-12-15 RX ADMIN — GUAIFENESIN 600 MG: 600 TABLET ORAL at 10:40

## 2023-12-15 RX ADMIN — ENOXAPARIN SODIUM 40 MG: 40 INJECTION SUBCUTANEOUS at 09:15

## 2023-12-15 RX ADMIN — ASPIRIN 81 MG CHEWABLE TABLET 81 MG: 81 TABLET CHEWABLE at 09:15

## 2023-12-15 NOTE — CASE MANAGEMENT
Case Management Discharge Planning Note    Patient name Kelsey Seat  Location /-59 MRN [id number]  : 1940 Date 12/15/2023       Current Admission Date: 2023  Current Admission Diagnosis:Osteomyelitis of finger of left hand [facility])   Patient Active Problem List    Diagnosis Date Noted    Nasal congestion 12/15/2023    Osteomyelitis of finger of left hand ([address]) 2023    TIA (transient ischemic attack) 2023    Dementia ([address]) 2023    Type 2 diabetes mellitus ([address]) 2023      LOS (days): 2  Geometric Mean LOS (GMLOS) (days): 3.9  Days to GMLOS:1.7     OBJECTIVE:  Risk of Unplanned Readmission Score: 8.86         Current admission status: Inpatient   Preferred Pharmacy:   [address] [place] [address] - [address]  [address]  [place] [address]  Phone: [phone] Fax: [phone]    Primary Care Provider: Irene Atwood MD    Primary Insurance: MEDICARE  Secondary Insurance: Abbey ALMODOVAR    DISCHARGE DETAILS:    Discharge planning discussed with[de-identified] Patient's son Cm [place] of Choice: Yes  Comments - Freedom of Choice: Cm contacted patient's son to review role of Cm. Son in agreement with DCP. CM arranged a s 1:15  through round trip. All parties made aware of the above.   CM contacted family/caregiver?: Yes  Were Treatment Team discharge recommendations reviewed with patient/caregiver?: Yes  Did patient/caregiver verbalize understanding of patient care needs?: Yes  Were patient/caregiver advised of the risks associated with not following Treatment Team discharge recommendations?: Yes    Contacts  Patient Contacts: Aleksey Jung  Relationship to Patient[de-identified] Family  Contact Method: Phone  Phone Number: [phone]  Reason/Outcome: Discharge  [facility] [address]         Is the patient interested in [facility] at discharge?: No    DME Referral Provided  Referral made for DME?: No    Other Referral/Resources/Interventions Provided:  Referral Comments: [facility] to [facility] and reserved         Treatment Team Recommendation: Facility Return  Discharge Destination Plan[de-identified] Facility Return  Transport at Discharge : BLS Ambulance     IMM Given (Date):: 12/15/23  IMM Given to[de-identified] Family  Family notified[de-identified] Julia Alex over the phone       Accepting Facility Name, [address] [place] [address] : [address] [place] [address],[facility] [address] [place]  Receiving Facility/Agency Phone Number: [phone]  Facility/Agency Fax Number: [phone]    IMM reviewed with patient's caregiver, patient and caregiver agrees with discharge determination. CM placed signed IMM in the bin to be scanned into chart.

## 2023-12-15 NOTE — PLAN OF CARE
Problem: Potential for Falls  Goal: Patient will remain free of falls  Description: INTERVENTIONS:  - Educate patient/family on patient safety including physical limitations  - Instruct patient to call for assistance with activity   - Consult OT/PT to assist with strengthening/mobility   - Keep Call bell within reach  - Keep bed low and locked with side rails adjusted as appropriate  - Keep care items and personal belongings within reach  - Initiate and maintain comfort rounds  - Make Fall Risk Sign visible to staff  - Offer Toileting every  Hours, in advance of need  - Initiate/Maintain alarm  - Obtain necessary fall risk management equipment:   - Apply yellow socks and bracelet for high fall risk patients  - Consider moving patient to room near nurses station  Outcome: Progressing     Problem: PAIN - ADULT  Goal: Verbalizes/displays adequate comfort level or baseline comfort level  Description: Interventions:  - Encourage patient to monitor pain and request assistance  - Assess pain using appropriate pain scale  - Administer analgesics based on type and severity of pain and evaluate response  - Implement non-pharmacological measures as appropriate and evaluate response  - Consider cultural and social influences on pain and pain management  - Notify physician/advanced practitioner if interventions unsuccessful or patient reports new pain  Outcome: Progressing     Problem: INFECTION - ADULT  Goal: Absence or prevention of progression during hospitalization  Description: INTERVENTIONS:  - Assess and monitor for signs and symptoms of infection  - Monitor lab/diagnostic results  - Monitor all insertion sites, i.e. indwelling lines, tubes, and drains  - Monitor endotracheal if appropriate and nasal secretions for changes in amount and color  - [place] appropriate cooling/warming therapies per order  - Administer medications as ordered  - Instruct and encourage patient and family to use good hand hygiene technique  - Identify and instruct in appropriate isolation precautions for identified infection/condition  Outcome: Progressing  Goal: Absence of fever/infection during neutropenic period  Description: INTERVENTIONS:  - Monitor WBC    Outcome: Progressing     Problem: SAFETY ADULT  Goal: Patient will remain free of falls  Description: INTERVENTIONS:  - Educate patient/family on patient safety including physical limitations  - Instruct patient to call for assistance with activity   - Consult OT/PT to assist with strengthening/mobility   - Keep Call bell within reach  - Keep bed low and locked with side rails adjusted as appropriate  - Keep care items and personal belongings within reach  - Initiate and maintain comfort rounds  - Make Fall Risk Sign visible to staff  - Offer Toileting every  Hours, in advance of need  - Initiate/Maintain alarm  - Obtain necessary fall risk management equipment:   - Apply yellow socks and bracelet for high fall risk patients  - Consider moving patient to room near nurses station  Outcome: Progressing  Goal: Maintain or return to baseline ADL function  Description: INTERVENTIONS:  -  Assess patient's ability to carry out ADLs; assess patient's baseline for ADL function and identify physical deficits which impact ability to perform ADLs (bathing, care of mouth/teeth, toileting, grooming, dressing, etc.)  - Assess/evaluate cause of self-care deficits   - Assess range of motion  - Assess patient's mobility; develop plan if impaired  - Assess patient's need for assistive devices and provide as appropriate  - Encourage maximum independence but intervene and supervise when necessary  - Involve family in performance of ADLs  - Assess for home care needs following discharge   - Consider OT consult to assist with ADL evaluation and planning for discharge  - Provide patient education as appropriate  Outcome: Progressing  Goal: Maintains/Returns to pre admission functional level  Description: INTERVENTIONS:  - Perform AM-PAC 6 Click Basic Mobility/ Daily Activity assessment daily.  - Set and communicate daily mobility goal to care team and patient/family/caregiver. - Collaborate with rehabilitation services on mobility goals if consulted  - Perform Range of Motion  times a day. - Reposition patient every  hours. - Dangle patient  times a day  - Stand patient  times a day  - Ambulate patient  times a day  - Out of bed to chair  times a day   - Out of bed for meals  times a day  - Out of bed for toileting  - Record patient progress and toleration of activity level   Outcome: Progressing     Problem: DISCHARGE PLANNING  Goal: Discharge to home or other facility with appropriate resources  Description: INTERVENTIONS:  - Identify barriers to discharge w/patient and caregiver  - Arrange for needed discharge resources and transportation as appropriate  - Identify discharge learning needs (meds, wound care, etc.)  - Arrange for interpretive services to assist at discharge as needed  - Refer to Case Management Department for coordinating discharge planning if the patient needs post-hospital services based on physician/advanced practitioner order or complex needs related to functional status, cognitive ability, or social support system  Outcome: Progressing     Problem: Knowledge Deficit  Goal: Patient/family/caregiver demonstrates understanding of disease process, treatment plan, medications, and discharge instructions  Description: Complete learning assessment and assess knowledge base.   Interventions:  - Provide teaching at level of understanding  - Provide teaching via preferred learning methods  Outcome: Progressing     Problem: SAFETY,RESTRAINT: NV/NON-SELF DESTRUCTIVE BEHAVIOR  Goal: Remains free of harm/injury (restraint for non violent/non self-detsructive behavior)  Description: INTERVENTIONS:  - Instruct patient/family regarding restraint use   - Assess and monitor physiologic and psychological status   - Provide interventions and comfort measures to meet assessed patient needs   - Identify and implement measures to help patient regain control  - Assess readiness for release of restraint   Outcome: Progressing  Goal: Returns to optimal restraint-free functioning  Description: INTERVENTIONS:  - Assess the patient's behavior and symptoms that indicate continued need for restraint  - Identify and implement measures to help patient regain control  - Assess readiness for release of restraint   Outcome: Progressing     Problem: Prexisting or High Potential for Compromised Skin Integrity  Goal: Skin integrity is maintained or improved  Description: INTERVENTIONS:  - Identify patients at risk for skin breakdown  - Assess and monitor skin integrity  - Assess and monitor nutrition and hydration status  - Monitor labs   - Assess for incontinence   - Turn and reposition patient  - Assist with mobility/ambulation  - Relieve pressure over bony prominences  - Avoid friction and shearing  - Provide appropriate hygiene as needed including keeping skin clean and dry  - Evaluate need for skin moisturizer/barrier cream  - Collaborate with interdisciplinary team   - Patient/family teaching  - Consider wound care consult   Outcome: Progressing

## 2023-12-15 NOTE — DISCHARGE SUMMARY
[address]  Discharge- Mike Sanchez 1940, 81 y.o. 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 Was licking finger post op   Continue local wound care and wrap finger well to avoid patient being able to access.   Recommend outpatient follow up with Orthopedic Surgery upon discharge     Dementia [facility])  Assessment & Plan  Patient with history of dementia - baseline orientation to self  Continue Namenda  Patient with behavioral disturbances requiring restraints and IM zyprexa on 12/13 at 1700  Per son no baseline behavioral disturbances   No further restraints or pharmacologic agents used  Appears stable at baseline  Recommend return to facility    Nasal congestion  Assessment & Plan  Patient now with complaints of bilateral nasal congestion  Remains afebrile with no further complaints  Initiate Mucinex and Flonase  Continue supportive care    Type 2 diabetes mellitus [facility])  Assessment & Plan  No results found for: "HGBA1C"    Recent Labs     12/14/23  1116 12/14/23  1639 12/14/23  2055 12/15/23  0745   POCGLU 127 141* 122 116         Blood Sugar Average: Last 72 hrs:  (P) [id number]    History of type 2 diabetes on oral medications only  Continue Diabetic diet    TIA (transient ischemic attack)  Assessment & Plan  Patient with history of TIA  Continue aspirin        Medical Problems       Resolved Problems  Date Reviewed: 12/15/2023   None       Discharging Physician / Practitioner: ALLI Garcia  PCP: Lorenza Sanchez MD  Admission Date:   Admission Orders (From admission, onward)       Ordered        12/13/23 0752  INPATIENT ADMISSION  Once                          Discharge Date: 12/15/23    Consultations During Hospital Stay:  Orthopedic Surgery    Procedures Performed:   S/p Middle Finger Amputation (12/13)    Significant Findings / Test Results:   Xray left middle finger: Loss of all but the proximal aspect of the distal phalanx of the third digit with associated surrounding soft tissue swelling. While some of this is likely posttraumatic in nature, indistinctness of the distal osseous margins are concerning for osteomyelitis    Incidental Findings:   None     Test Results Pending at Discharge (will require follow up): Final Blood Culture Results  Surgical Wound Cultures      Outpatient Tests Requested:  Recommend outpatient follow up with PCP  Recommend outpatient follow up with Orthopedic Surgery     Complications:  Agitation    Reason for Admission: [facility] Course: Michael Giffodr is a 81 y.o. male patient with a PMH of vascular dementia, essential hypertension, CKD stage III, TIA, type 2 diabetes who originally presented to the hospital on 12/12/2023 due to wound concerning for osteomyelitis. Ordered by his nursing facility to present to the ED due to an open wound with exposed bone concerning for osteomyelitis. Upon arrival to the ED, Imaging with x-ray concerning for osteomyelitis. Orthopedic surgery was consulted and patient underwent finger amputation. Patient was medically stable to return to the facility following procedure but ended up experiencing some agitation requiring physical/pharmacological restraints. Patient with improved behavior while admitted, remaining free of further restraints. Patient stable for return to previous facility. Recommend continuing antibiotics upon discharge. Recommend outpatient follow up with PCP and Orthopedic surgery.     Plan was discussed with son via phone, all questions were answered. Please see above list of diagnoses and related plan for additional information. Condition at Discharge: good    Discharge Day Visit / Exam:   Subjective:  Patient stating we are keeping him against his will. Patient stating his finger is a problem and the dressing is not in the right place. Vitals: Blood Pressure: (!) 101/46 (12/15/23 0933)  Pulse: 65 (12/15/23 0933)  Temperature: 97.8 °F (36.6 °C) (12/15/23 0933)  Temp Source: Tympanic (12/15/23 0933)  Respirations: 20 (12/15/23 0933)  Height: 5' 11" (180.3 cm) (12/13/23 0841)  Weight - Scale: 69.8 kg (153 lb 14.1 oz) (12/14/23 0600)  SpO2: 95 % (12/15/23 0933)  Exam:   Physical Exam  Vitals and nursing note reviewed. Constitutional:       General: He is not in acute distress. Appearance: He is not ill-appearing. HENT:      Head: Normocephalic. Nose: Nose normal. No congestion. Mouth/Throat:      Mouth: Mucous membranes are moist.      Pharynx: Oropharynx is clear. Cardiovascular:      Rate and Rhythm: Normal rate and regular rhythm. Pulses: Normal pulses. Heart sounds: Normal heart sounds. No murmur heard. Pulmonary:      Effort: Pulmonary effort is normal. No respiratory distress. Breath sounds: Normal breath sounds. Abdominal:      General: Bowel sounds are normal. There is no distension. Palpations: Abdomen is soft. Tenderness: There is no abdominal tenderness. Musculoskeletal:         General: Normal range of motion. Cervical back: Normal range of motion. Skin:     General: Skin is warm and dry. Capillary Refill: Capillary refill takes less than 2 seconds. Comments: Left middle finger, Dressing CDI   Neurological:      Mental Status: He is alert. Mental status is at baseline. Motor: No weakness. Psychiatric:         Mood and Affect: Mood is anxious. Speech: Speech normal.         Behavior: Behavior is agitated (At Times. Easily redirectable). Cognition and Memory: Cognition is impaired. Memory is impaired. Judgment: Judgment is impulsive. Discussion with Family: Updated  (son) via phone. Discharge instructions/Information to patient and family:   See after visit summary for information provided to patient and family. Provisions for Follow-Up Care:  See after visit summary for information related to follow-up care and any pertinent home health orders. Mobility at time of Discharge:   Basic Mobility Inpatient Raw Score: 18  JH-HLM Goal: 6: Walk 10 steps or more  JH-HLM Achieved: 7: Walk 25 feet or more  HLM Goal achieved. Continue to encourage appropriate mobility. Disposition:   Long Term Acute Care (LTAC) Facility at [address] [facility]    Planned Readmission: No     Discharge Statement:  I spent 60 minutes discharging the patient. This time was spent on the day of discharge. I had direct contact with the patient on the day of discharge. Greater than 50% of the total time was spent examining patient, answering all patient questions, arranging and discussing plan of care with patient as well as directly providing post-discharge instructions. Additional time then spent on discharge activities. Discharge Medications:  See after visit summary for reconciled discharge medications provided to patient and/or family.       **Please Note: This note may have been constructed using a voice recognition system**

## 2023-12-15 NOTE — ASSESSMENT & PLAN NOTE
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 Was licking finger post op   Continue local wound care and wrap finger well to avoid patient being able to access.   Recommend outpatient follow up with Orthopedic Surgery upon discharge

## 2023-12-15 NOTE — ASSESSMENT & PLAN NOTE
Patient now with complaints of bilateral nasal congestion  Remains afebrile with no further complaints  Initiate Mucinex and Flonase  Continue supportive care

## 2023-12-15 NOTE — PLAN OF CARE
Problem: Potential for Falls  Goal: Patient will remain free of falls  Description: INTERVENTIONS:  - Educate patient/family on patient safety including physical limitations  - Instruct patient to call for assistance with activity   - Consult OT/PT to assist with strengthening/mobility   - Keep Call bell within reach  - Keep bed low and locked with side rails adjusted as appropriate  - Keep care items and personal belongings within reach  - Initiate and maintain comfort rounds  - Offer Toileting every 2 Hours, in advance of need  - Initiate/Maintain bed alarm  - Apply yellow socks and bracelet for high fall risk patients  - Consider moving patient to room near nurses station  Outcome: Progressing     Problem: INFECTION - ADULT  Goal: Absence of fever/infection during neutropenic period  Description: INTERVENTIONS:  - Monitor WBC    Outcome: Progressing     Problem: SAFETY ADULT  Goal: Patient will remain free of falls  Description: INTERVENTIONS:  - Educate patient/family on patient safety including physical limitations  - Instruct patient to call for assistance with activity   - Consult OT/PT to assist with strengthening/mobility   - Keep Call bell within reach  - Keep bed low and locked with side rails adjusted as appropriate  - Keep care items and personal belongings within reach  - Initiate and maintain comfort rounds  - Make Fall Risk Sign visible to staff  - Offer Toileting every 2 Hours, in advance of need  - Initiate/Maintain bed alarm  - Apply yellow socks and bracelet for high fall risk patients  - Consider moving patient to room near nurses station  Outcome: Progressing     Problem: Prexisting or High Potential for Compromised Skin Integrity  Goal: Skin integrity is maintained or improved  Description: INTERVENTIONS:  - Identify patients at risk for skin breakdown  - Assess and monitor skin integrity  - Assess and monitor nutrition and hydration status  - Monitor labs   - Assess for incontinence   - Turn and reposition patient  - Assist with mobility/ambulation  - Relieve pressure over bony prominences  - Avoid friction and shearing  - Provide appropriate hygiene as needed including keeping skin clean and dry  - Evaluate need for skin moisturizer/barrier cream  - Collaborate with interdisciplinary team   - Patient/family teaching  - Consider wound care consult   Outcome: Progressing

## 2023-12-15 NOTE — PHYSICAL THERAPY NOTE
PHYSICAL THERAPY Evaluation  DATE: 12/15/23  TIME: 3777-2869    NAME:  Melissa Saab  AGE:   81 y.o. 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)     Vascular dementia, moderate, without behavioral disturbance, psychotic disturbance, mood disturbance, and anxiety (HCC)     Vitamin D deficiency disease      Past Surgical History:   Procedure Laterality Date    FINGER AMPUTATION Left 12/13/2023    Procedure: AMPUTATION FINGER - LEFT MIDDLE;  Surgeon: Sander Talley MD;  Location:  MAIN OR;  Service: Orthopedics        12/15/23 1026   PT Last Visit   PT Visit Date 12/15/23   Note Type   Note type Evaluation   Pain Assessment   Pain Assessment Tool 0-10   Pain Score No Pain   Pain Location/Orientation Orientation: Left  (middle finger)   Restrictions/Precautions   Weight Bearing Precautions Per Order No   Other Precautions Chair Alarm; Bed Alarm;Cognitive; Fall Risk  (Okay for active range of motion of hand and digits.)   Home Living   Additional Comments Pt is from LTC facility ([address] [facility])   Prior Function   Level of [place] Needs assistance with ADLs; Needs assistance with functional mobility; Needs assistance with [address] [place] [address] Rd staff   Receives Help From Personal care attendant   Comments PT is a poor historian and unable to provide consistent information about PLOF.    General   Additional Pertinent History 80 y/o presents with left 3rd finger wound/infection, following puncture event on 11/20/23. Xray: suspect osteomyelitis. S/p left 3rd finger amputation on 12/13. Family/Caregiver Present No   Cognition   Overall Cognitive Status Impaired   Arousal/Participation Alert   Orientation Level Oriented to person;Disoriented to place; Disoriented to time;Disoriented to situation  (responds to his name)   Subjective   Subjective Pt does not report any complaints. Pt mostly only verbalizes non-sensical information. RUE Assessment   RUE Assessment WFL   LUE Assessment   LUE Assessment WFL   RLE Assessment   RLE Assessment WFL   LLE Assessment   LLE Assessment WFL   Coordination   Movements are Fluid and Coordinated 1   Sensation WFL   Transfers   Sit to Stand 4  Minimal assistance  (pt presents sitting EOB.)   Additional items Increased time required;Verbal cues  (during first attempt pt pulls on walker, but has posterior LOB and return to sitting.  with cues/instruction to push off bed, pt able to get to standing under own strength but CGA provided for safety.)   Stand to Sit 4  Minimal assistance   Additional items Armrests; Increased time required;Verbal cues; Impulsive  (cues for positioning, and assist to ensure proper hand placement and control of descent)   Ambulation/Elevation   Gait Assistance 4  Minimal assist   Additional items Verbal cues; Tactile cues   Assistive Device Rolling walker   Distance 100'   Ambulation/Elevation Additional Comments Pt ambulates with RW using reciprocal gait. Pt with tendency to advance walker beyond proper distance and flex forward. poor carry over with cues. no significant LOB, but poor management of walker during turns requiring assist to prevent possible fall.    Balance   Static Sitting Good   Dynamic Sitting Good   Static Standing Fair   Dynamic Standing Fair   Ambulatory Fair  (RW)   Endurance Deficit   Endurance Deficit No   Activity Tolerance   Activity Tolerance   (limited by cognition and ability to learn)   Medical Staff Made Aware collaborated with OT   Nurse Made Aware nursing aware of patient's performance   Assessment   Prognosis Poor   Problem List Decreased range of motion; Impaired balance;Decreased mobility; Decreased cognition; Impaired judgement;Decreased safety awareness   Assessment Orders for PT eval and treat received. Pt's PMHx: gait abnormality, muscle weakness, TIA, dementia. Pt exhibits physical deficits noted in problem list above. Deficits listed contribute to functional limitations that are significant from the patient PLOF and include: impaired standing balance, inability to perform safe transfers, unsafe/inefficient ambulation, and fall risk    During today's session, performed limited PT evaluation due to difficulty for patient to follow instructions for testing. Pt does show basic level of tolerance and ability to transfer and mobilize with AD, but should always have someone with him. Pt displays high potential for falling due to decreased safety awareness, gait/postural deviations, and difficulty learning. The AM-PAC & Barthel Index outcome tools were used to assist in determining pt safety w/ mobility/self care & appropriate d/c recommendations, see above for scores. Patient's clinical presentation is stable  despite recent surgery/procedure, ongoing medical management needs, and complicated social/support system. Considering the patient's PLOF, co-morbidities, acute functional limitations, functional outcome measures, and/or goal to progress functional independence; this patient would benefit from skilled Physical Therapy intervention in the acute care setting. Barriers to Discharge None   Goals   Patient Goals none reported   [facility] Expiration Date 12/25/23   Short Term Goal #1 1: Pt will participate in 15 minutes of HEP with instructions.  2: Pt will ambulate 300' with RW, with sup A. 3: Pt will tolerate >15 minutes of standing functional activities/exercises. 4: Pt will perform bed mobility and transfers, sup A. Plan   Treatment/Interventions Functional transfer training;LE strengthening/ROM; Therapeutic exercise; Endurance training;Patient/family training;Equipment eval/education; Bed mobility;Gait training   PT Frequency 2-3x/wk   Discharge Recommendation   Rehab Resource Intensity Level, PT No post-acute rehabilitation needs   AM-PAC Basic Mobility Inpatient   Turning in Flat Bed Without Bedrails 3   Lying on Back to Sitting on Edge of Flat Bed Without Bedrails 3   Moving Bed to Chair 3   Standing Up From Chair Using Arms 3   Walk in Room 3   Climb 3-5 Stairs With Railing 3   Basic Mobility Inpatient Raw Score 18   Basic Mobility Standardized Score 41.05   Highest Level Of Mobility   JH-HLM Goal 6: Walk 10 steps or more   JH-HLM Achieved 7: Walk 25 feet or more   Barthel Index   Feeding 5   Bathing 0   Grooming Score 0   Dressing Score 0   Bladder Score 5   Bowels Score 5   Toilet Use Score 5   Transfers (Bed/Chair) Score 10   Mobility (Level Surface) Score 10   Stairs Score 5   Barthel Index Score 45   End of Consult   Patient Position at End of Consult Bedside chair;Bed/Chair alarm activated; All needs within reach   The patient's AM-PAC Basic Mobility Inpatient Short Form Raw Score is 18. A Raw score of greater than or equal to 16 suggests the patient may benefit from discharge to home. Please also refer to the recommendation of the Physical Therapist for safe discharge planning.     Gavin Hernandez, PT, DPT  [place] Licensure #[id number]

## 2023-12-15 NOTE — PLAN OF CARE
Problem: PHYSICAL THERAPY ADULT  Goal: Performs mobility at highest level of function for planned discharge setting. See evaluation for individualized goals. Description: Treatment/Interventions: Functional transfer training, LE strengthening/ROM, Therapeutic exercise, Endurance training, Patient/family training, Equipment eval/education, Bed mobility, Gait training          See flowsheet documentation for full assessment, interventions and recommendations. Note: Prognosis: Poor  Problem List: Decreased range of motion, Impaired balance, Decreased mobility, Decreased cognition, Impaired judgement, Decreased safety awareness  Assessment: Orders for PT eval and treat received. Pt's PMHx: gait abnormality, muscle weakness, TIA, dementia. Pt exhibits physical deficits noted in problem list above. Deficits listed contribute to functional limitations that are significant from the patient PLOF and include: impaired standing balance, inability to perform safe transfers, unsafe/inefficient ambulation, and fall risk    During today's session, performed limited PT evaluation due to difficulty for patient to follow instructions for testing. Pt does show basic level of tolerance and ability to transfer and mobilize with AD, but should always have someone with him. Pt displays high potential for falling due to decreased safety awareness, gait/postural deviations, and difficulty learning. The AM-PAC & Barthel Index outcome tools were used to assist in determining pt safety w/ mobility/self care & appropriate d/c recommendations, see above for scores. Patient's clinical presentation is stable  despite recent surgery/procedure, ongoing medical management needs, and complicated social/support system.      Considering the patient's PLOF, co-morbidities, acute functional limitations, functional outcome measures, and/or goal to progress functional independence; this patient would benefit from skilled Physical Therapy intervention in the acute care setting. Barriers to Discharge: None     Rehab Resource Intensity Level, PT: No post-acute rehabilitation needs    See flowsheet documentation for full assessment.

## 2023-12-15 NOTE — ASSESSMENT & PLAN NOTE
Patient with history of dementia - baseline orientation to self  Continue Namenda  Patient with behavioral disturbances requiring restraints and IM zyprexa on 12/13 at 1700  Per son no baseline behavioral disturbances   No further restraints or pharmacologic agents used  Appears stable at baseline  Recommend return to facility

## 2023-12-15 NOTE — ASSESSMENT & PLAN NOTE
No results found for: "HGBA1C"    Recent Labs     12/14/23  1116 12/14/23  1639 12/14/23  2055 12/15/23  0745   POCGLU 127 141* 122 116         Blood Sugar Average: Last 72 hrs:  (P) 117.0785403773554817    History of type 2 diabetes on oral medications only  Continue Diabetic diet

## 2023-12-15 NOTE — OCCUPATIONAL THERAPY NOTE
Occupational Therapy Evaluation     Patient Name: Lulú Ambrocio  IOECW'G Date: 12/15/2023  Problem List  Principal Problem:    Osteomyelitis of finger of left hand ([address])  Active Problems:    TIA (transient ischemic attack)    Dementia ([address])    Type 2 diabetes mellitus (HCC)    Nasal congestion    Past Medical History  Past Medical History:   Diagnosis Date    Abnormality of gait     Aortic ectasia, unspecified site (HCC)     Bile duct proliferation     Chronic kidney disease, stage 3a (HCC)     Essential hypertension, benign     Hyperlipemia     Lack of coordination     Muscle weakness (generalized)     Poorly controlled type 2 diabetes mellitus with circulatory disorder (HCC)     TIA (transient ischemic attack)     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)     Vascular dementia, moderate, without behavioral disturbance, psychotic disturbance, mood disturbance, and anxiety (HCC)     Vitamin D deficiency disease      Past Surgical History  Past Surgical History:   Procedure Laterality Date    FINGER AMPUTATION Left 12/13/2023    Procedure: AMPUTATION FINGER - LEFT MIDDLE;  Surgeon: Viridiana Paredes MD;  Location:  MAIN OR;  Service: Orthopedics         12/15/23 1035   OT Last Visit   OT Visit Date 12/15/23   Note Type   Note type Evaluation   Pain Assessment   Pain Assessment Tool 0-10   Pain Score No Pain   Restrictions/Precautions   Weight Bearing Precautions Per Order No   Other Precautions Chair Alarm; Bed Alarm; Fall Risk;Cognitive  (Digit ROM okay.)   Home Living   Type of Home SNF  ([address] [facility])   Home Equipment Shirbhupendra Gastelum   Prior Function   Level of [place] Needs assistance with ADLs; Needs assistance with [address] [place] [address] Rd staff   Receives Help From Personal care attendant   Comments Pt with hx of dementia. Speech non-sensical t/o. Pt unable to provide soci   Subjective   Subjective Pt received sitting EOB.  Pt pleasantly confused   ADL   Eating Assistance 5  Supervision/Setup   Grooming Assistance 4  Minimal Assistance   UB Bathing Assistance 3  Moderate Assistance   LB Bathing Assistance 3  Moderate Assistance   UB Dressing Assistance 3  Moderate Assistance   LB Dressing Assistance 3  Moderate Assistance   Toileting Assistance  3  Moderate Assistance   Transfers   Sit to Stand 4  Minimal assistance   Additional items Assist x 1;Verbal cues; Increased time required   Stand to Sit 4  Minimal assistance   Additional items Assist x 1;Verbal cues; Increased time required   Functional Mobility   Functional Mobility 4  Minimal assistance   Additional Comments x 1, RW   Balance   Static Sitting Good   Dynamic Sitting Fair +   Static Standing Fair -   Dynamic Standing Fair -   Activity Tolerance   Activity Tolerance Patient tolerated treatment well  (PT Liliam Hernandez)   Medical Staff Made Aware CHARLIE Lowe   RULOREN Assessment   RUE Assessment WFL   LUE Assessment   LUE Assessment WFL   Cognition   Overall Cognitive Status Impaired   Arousal/Participation Alert   Attention Attends with cues to redirect   Orientation Level Oriented to person;Disoriented to place; Disoriented to time;Disoriented to situation   Memory Decreased short term memory;Decreased recall of recent events;Decreased recall of precautions   Following Commands Follows one step commands with increased time or repetition   Assessment   Assessment Pt is a 81 y.o. male [facility] admitted 12/12/2023 w/ Osteomyelitis of finger of left hand ([address] Comorbidities affecting pt's functional performance at time of assessment include: dementia, TIA, type II DM, CKD, etc (see chart). Personal factors affecting pt at time of IE include:limited insight into deficits. Prior to admission, pt was living at [facility] of [address] [facility]. Pt required assist with ADLs and used a RW for mobility. Upon evaluation pt presents at functional baseline.   Full objective findings from OT assessment regarding body systems outlined above. Pt educated on performing hand ROM. However 2/2 to baseline dementia, would benefit from encouragement from nursing staff to frequently perform. No further OT needs indicated at this time. This evaluation required an extensive review of medical and/or therapy records and additional review of physical, cognitive and psychosocial history related to functional performance. Based upon functional performance deficits and assessments, this evaluation has been identified as a high complexity evaluation. At this time, OT recommendations at time of discharge are level return to facility with no OT needs. Goals   Patient Goals Pt did not verbalize any goals. Plan   OT Frequency Eval only   Discharge Recommendation   Rehab Resource Intensity Level, OT No post-acute rehabilitation needs  (Pt appears to be at baseine functional status. At baseline requires assist with ADlLs.)   Additional Comments  The patient's raw score on the AM-PAC Daily Activity inpatient short form is 17, standardized score is 37.26, less than 39.4. Patients at this level are likely to benefit from DC to post-acute rehabilitation services. However please refer to therapist recommendation for discharge planning given other factors that may influence destination.    AM-PAC Daily Activity Inpatient   Lower Body Dressing 2   Bathing 2   Toileting 3   Upper Body Dressing 3   Grooming 3   Eating 4   Daily Activity Raw Score 17   Daily Activity Standardized Score (Calc for Raw Score >=11) 37.26   AM-PAC Applied Cognition Inpatient   Following a Speech/Presentation 1   Understanding Ordinary Conversation 2   Taking Medications 1   Remembering Where Things Are Placed or Put Away 1   Remembering List of 4-5 Errands 1   Taking Care of Complicated Tasks 1   Applied Cognition Raw Score 7   Applied Cognition Standardized Score 15.17   End of Consult   Education Provided Yes   Patient Position at End of Consult Bedside chair;Bed/Chair alarm activated; All needs within reach   Nurse Communication Nurse aware of consult         [place], OTR/L

## 2023-12-15 NOTE — PROGRESS NOTES
Progress Note - Orthopedics   Amelia Pena 81 y.o. male MRN: [id number]  Unit/Bed#: -01      Subjective:    83 y.o.male POD #2 left middle finger amputation with neurectomy. No acute events. Pt currently denying pain in the finger.     Labs:  0   Lab Value Date/Time    HCT 35.6 (L) 12/14/2023 0459    HCT 37.0 12/13/2023 0952    HCT 36.2 (L) 12/12/2023 1846    HGB 11.6 (L) 12/14/2023 0459    HGB 12.0 12/13/2023 0952    HGB 11.8 (L) 12/12/2023 1846    INR 1.16 12/13/2023 0952    WBC 7.50 12/14/2023 0459    WBC 6.70 12/13/2023 0952    WBC 7.63 12/12/2023 1846    ESR 29 (H) 12/12/2023 1846    CRP <1.0 12/12/2023 1846       Meds:    Current Facility-Administered Medications:     amoxicillin-clavulanate (AUGMENTIN) 875-125 mg per tablet 1 tablet, 1 tablet, Oral, Q12H [address], Annabella Velasco PA-C, 1 tablet at 12/15/23 0915    aspirin chewable tablet 81 mg, 81 mg, Oral, Daily, Annabella Velasco PA-C, 81 mg at 12/15/23 0915    enoxaparin (LOVENOX) subcutaneous injection 40 mg, 40 mg, Subcutaneous, Q24H [facility], Mariposa Luna PA-C, 40 mg at 12/15/23 0915    fluticasone (FLONASE) 50 mcg/act nasal spray 1 spray, 1 spray, Each Nare, Daily, ALLI Perez    guaiFENesin (MUCINEX) 12 hr tablet 600 mg, 600 mg, Oral, Q12H [address], ALLI Perez    insulin lispro (HumaLOG) 100 units/mL subcutaneous injection 1-5 Units, 1-5 Units, Subcutaneous, TID AC **AND** Fingerstick Glucose (POCT), , , TID AC, Annabella Velasco PA-C    memantine (NAMENDA) tablet 5 mg, 5 mg, Oral, HS, Annabella Velasco PA-C, 5 mg at 12/14/23 2100    pravastatin (PRAVACHOL) tablet 80 mg, 80 mg, Oral, Daily With Dinner, MARCK Mandel, 80 mg at 12/14/23 1640    sertraline (ZOLOFT) tablet 50 mg, 50 mg, Oral, Daily, Annabella Velasco PA-C, 50 mg at 12/15/23 0915    Blood Culture:   Lab Results   Component Value Date    BLOODCX No Growth at 24 hrs. 12/12/2023    BLOODCX No Growth at 24 hrs. 12/12/2023       Wound Culture:   No results found for: "WOUNDCULT"    Ins and Outs:  I/O last 24 hours: In: 650 [P.O.:650]  Out: -           Physical:  Vitals:    12/15/23 0933   BP: (!) 101/46   Pulse: 65   Resp: 20   Temp: 97.8 °F (36.6 °C)   SpO2: 95%     Musculoskeletal: left Upper Extremity  Skin - incision c/d/I. Dressing falling off finger upon entrance to room. Original surgical dressing was no longer in place. Minimal bloody drainage on dressings. This was replaced and reinforced around the hand today to try to prevent it falling off. SILT radial/ulnar finger. Finger is well-perfused.      _*_*_*_*_*_*_*_*_*_*_*_*_*_*_*_*_*_*_*_*_*_*_*_*_*_*_*_*_*_*_*_*_*_*_*_*_*_*_*_*_*    Assessment:    83 y.o.male POD #2 left middle finger amputation with I&D    Plan:  Limit WB to 1-2 lbs with this hand as much as possible  Dressing changed today, keep intact. If dressing becomes wet/dirty, please change ASAP. Pain control per primary  Augmentin x 7 days  Dispo: Ok for discharge from ortho perspective.  Will have him follow up on Tues 12/19 in the office    Rayo Alvarado PA-C

## 2023-12-16 LAB
BACTERIA TISS AEROBE CULT: ABNORMAL
GRAM STN SPEC: ABNORMAL

## 2023-12-17 LAB
BACTERIA BLD CULT: NORMAL
BACTERIA BLD CULT: NORMAL

## 2023-12-18 LAB
BACTERIA BLD CULT: NORMAL
BACTERIA BLD CULT: NORMAL

## 2023-12-19 ENCOUNTER — OFFICE VISIT (OUTPATIENT)
Dept: OBGYN CLINIC | Facility: CLINIC | Age: 83
End: 2023-12-19

## 2023-12-19 VITALS — BODY MASS INDEX: 21.42 KG/M2 | HEIGHT: 71 IN | WEIGHT: 153 LBS

## 2023-12-19 DIAGNOSIS — S61.209A OPEN WOUND OF FINGER, INITIAL ENCOUNTER: ICD-10-CM

## 2023-12-19 LAB
MYCOBACTERIUM SPEC CULT: NORMAL
RHODAMINE-AURAMINE STN SPEC: NORMAL

## 2023-12-19 PROCEDURE — 99024 POSTOP FOLLOW-UP VISIT: CPT | Performed by: STUDENT IN AN ORGANIZED HEALTH CARE EDUCATION/TRAINING PROGRAM

## 2023-12-19 RX ORDER — L. ACIDOPHILUS/L.BULGARICUS 1MM CELL
TABLET ORAL
COMMUNITY

## 2023-12-19 NOTE — PROGRESS NOTES
Assessment/Plan:  Patient ID: 83 y.o. male   Surgery: Amputation Finger - Left Middle - Left  Date of Surgery: 12/13/2023    Pt overall is healing well.  At this time, he can wash with soap and water.  No soaking.  Can then dry and cover with a Band-Aid.  Finish his abx.  Therapy to work on ROM and edema control    Follow Up:  2 weeks    To Do Next Visit:  Re-evaluation of current issue      CHIEF COMPLAINT:  Chief Complaint   Patient presents with   • Left Middle Finger - Post-op         SUBJECTIVE:  Patient is a 83 y.o. year old male who presents for follow up after Amputation Finger - Left Middle - Left. Today patient states he is doing well. No complaints with the finger. Pt was prescribed Augmentin to last until 12/20/23.       PHYSICAL EXAMINATION:  General: Well developed and well nourished, alert and oriented x 3, appears comfortable  Psychiatric: Normal    MUSCULOSKELETAL EXAMINATION:  Incision: Clean, dry, intact  Surgery Site: normal, no evidence of infection   Range of Motion: As expected and Limited due to stiffness  Neurovascular status: Neuro intact, good cap refill         STUDIES REVIEWED:  Lab results were reviewed and independently interpreted by Dr. Ayala and demonstrate 1 colony Staph coag negative        PROCEDURES PERFORMED:  Procedures  No Procedures performed today    Scribe Attestation    I,:  Annabella Velasco PA-C am acting as a scribe while in the presence of the attending physician.:       I,:  Max Ayala MD personally performed the services described in this documentation    as scribed in my presence.:

## 2023-12-27 LAB
MYCOBACTERIUM SPEC CULT: NORMAL
RHODAMINE-AURAMINE STN SPEC: NORMAL

## 2024-01-03 LAB
MYCOBACTERIUM SPEC CULT: NORMAL
RHODAMINE-AURAMINE STN SPEC: NORMAL

## 2024-01-09 LAB
MYCOBACTERIUM SPEC CULT: NORMAL
RHODAMINE-AURAMINE STN SPEC: NORMAL

## 2024-01-16 LAB
MYCOBACTERIUM SPEC CULT: NORMAL
RHODAMINE-AURAMINE STN SPEC: NORMAL

## 2024-01-23 LAB
MYCOBACTERIUM SPEC CULT: NORMAL
RHODAMINE-AURAMINE STN SPEC: NORMAL

## 2024-01-26 ENCOUNTER — HOSPITAL ENCOUNTER (EMERGENCY)
Facility: HOSPITAL | Age: 84
Discharge: HOME | End: 2024-01-26
Attending: EMERGENCY MEDICINE
Payer: COMMERCIAL

## 2024-01-26 ENCOUNTER — APPOINTMENT (EMERGENCY)
Dept: CT IMAGING | Facility: HOSPITAL | Age: 84
End: 2024-01-26
Payer: COMMERCIAL

## 2024-01-26 VITALS
OXYGEN SATURATION: 97 % | RESPIRATION RATE: 16 BRPM | DIASTOLIC BLOOD PRESSURE: 67 MMHG | TEMPERATURE: 99.4 F | HEART RATE: 81 BPM | SYSTOLIC BLOOD PRESSURE: 149 MMHG

## 2024-01-26 DIAGNOSIS — F01.50 VASCULAR DEMENTIA WITHOUT BEHAVIORAL DISTURBANCE, PSYCHOTIC DISTURBANCE, MOOD DISTURBANCE, OR ANXIETY, UNSPECIFIED DEMENTIA SEVERITY (HCC): Primary | ICD-10-CM

## 2024-01-26 LAB
ALBUMIN SERPL BCP-MCNC: 3.8 G/DL (ref 3.5–5)
ALP SERPL-CCNC: 70 U/L (ref 34–104)
ALT SERPL W P-5'-P-CCNC: 19 U/L (ref 7–52)
ANION GAP SERPL CALCULATED.3IONS-SCNC: 8 MMOL/L
AST SERPL W P-5'-P-CCNC: 25 U/L (ref 13–39)
BASOPHILS # BLD AUTO: 0.02 THOUSANDS/ÂΜL (ref 0–0.1)
BASOPHILS NFR BLD AUTO: 0 % (ref 0–1)
BILIRUB SERPL-MCNC: 0.28 MG/DL (ref 0.2–1)
BILIRUB UR QL STRIP: NEGATIVE
BUN SERPL-MCNC: 27 MG/DL (ref 5–25)
CALCIUM SERPL-MCNC: 9.4 MG/DL (ref 8.4–10.2)
CHLORIDE SERPL-SCNC: 103 MMOL/L (ref 96–108)
CLARITY UR: CLEAR
CO2 SERPL-SCNC: 25 MMOL/L (ref 21–32)
COLOR UR: YELLOW
CREAT SERPL-MCNC: 1.16 MG/DL (ref 0.6–1.3)
EOSINOPHIL # BLD AUTO: 0.08 THOUSAND/ÂΜL (ref 0–0.61)
EOSINOPHIL NFR BLD AUTO: 1 % (ref 0–6)
ERYTHROCYTE [DISTWIDTH] IN BLOOD BY AUTOMATED COUNT: 13.4 % (ref 11.6–15.1)
FLUAV RNA RESP QL NAA+PROBE: NEGATIVE
FLUBV RNA RESP QL NAA+PROBE: NEGATIVE
GFR SERPL CREATININE-BSD FRML MDRD: 58 ML/MIN/1.73SQ M
GLUCOSE SERPL-MCNC: 110 MG/DL (ref 65–140)
GLUCOSE UR STRIP-MCNC: NEGATIVE MG/DL
HCT VFR BLD AUTO: 35.9 % (ref 36.5–49.3)
HGB BLD-MCNC: 11.9 G/DL (ref 12–17)
HGB UR QL STRIP.AUTO: NEGATIVE
IMM GRANULOCYTES # BLD AUTO: 0.02 THOUSAND/UL (ref 0–0.2)
IMM GRANULOCYTES NFR BLD AUTO: 0 % (ref 0–2)
KETONES UR STRIP-MCNC: NEGATIVE MG/DL
LEUKOCYTE ESTERASE UR QL STRIP: NEGATIVE
LYMPHOCYTES # BLD AUTO: 1.63 THOUSANDS/ÂΜL (ref 0.6–4.47)
LYMPHOCYTES NFR BLD AUTO: 24 % (ref 14–44)
MCH RBC QN AUTO: 31.6 PG (ref 26.8–34.3)
MCHC RBC AUTO-ENTMCNC: 33.1 G/DL (ref 31.4–37.4)
MCV RBC AUTO: 96 FL (ref 82–98)
MONOCYTES # BLD AUTO: 1.3 THOUSAND/ÂΜL (ref 0.17–1.22)
MONOCYTES NFR BLD AUTO: 19 % (ref 4–12)
NEUTROPHILS # BLD AUTO: 3.72 THOUSANDS/ÂΜL (ref 1.85–7.62)
NEUTS SEG NFR BLD AUTO: 56 % (ref 43–75)
NITRITE UR QL STRIP: NEGATIVE
NRBC BLD AUTO-RTO: 0 /100 WBCS
PH UR STRIP.AUTO: 6 [PH]
PLATELET # BLD AUTO: 167 THOUSANDS/UL (ref 149–390)
PMV BLD AUTO: 10.3 FL (ref 8.9–12.7)
POTASSIUM SERPL-SCNC: 4 MMOL/L (ref 3.5–5.3)
PROT SERPL-MCNC: 6.7 G/DL (ref 6.4–8.4)
PROT UR STRIP-MCNC: NEGATIVE MG/DL
RBC # BLD AUTO: 3.76 MILLION/UL (ref 3.88–5.62)
RSV RNA RESP QL NAA+PROBE: NEGATIVE
SARS-COV-2 RNA RESP QL NAA+PROBE: NEGATIVE
SODIUM SERPL-SCNC: 136 MMOL/L (ref 135–147)
SP GR UR STRIP.AUTO: 1.02 (ref 1–1.03)
UROBILINOGEN UR QL STRIP.AUTO: 0.2 E.U./DL
WBC # BLD AUTO: 6.77 THOUSAND/UL (ref 4.31–10.16)

## 2024-01-26 PROCEDURE — 85025 COMPLETE CBC W/AUTO DIFF WBC: CPT | Performed by: EMERGENCY MEDICINE

## 2024-01-26 PROCEDURE — 93005 ELECTROCARDIOGRAM TRACING: CPT

## 2024-01-26 PROCEDURE — 99285 EMERGENCY DEPT VISIT HI MDM: CPT | Performed by: EMERGENCY MEDICINE

## 2024-01-26 PROCEDURE — G1004 CDSM NDSC: HCPCS

## 2024-01-26 PROCEDURE — 70450 CT HEAD/BRAIN W/O DYE: CPT

## 2024-01-26 PROCEDURE — 80053 COMPREHEN METABOLIC PANEL: CPT | Performed by: EMERGENCY MEDICINE

## 2024-01-26 PROCEDURE — 36415 COLL VENOUS BLD VENIPUNCTURE: CPT | Performed by: EMERGENCY MEDICINE

## 2024-01-26 PROCEDURE — 81003 URINALYSIS AUTO W/O SCOPE: CPT | Performed by: EMERGENCY MEDICINE

## 2024-01-26 PROCEDURE — 99285 EMERGENCY DEPT VISIT HI MDM: CPT

## 2024-01-26 PROCEDURE — 0241U HB NFCT DS VIR RESP RNA 4 TRGT: CPT | Performed by: EMERGENCY MEDICINE

## 2024-01-26 NOTE — ED PROVIDER NOTES
History  Chief Complaint   Patient presents with    Altered Mental Status     Brought in by EMS, pt presents after several falls at SNF, aggressive behavior towards staff. Pt hx vascular dementia     83-year-old male sent in for altered mental status from SNF.  They report the patient has had several falls recently and has had aggressive behavior towards staff.  Patient has a history of vascular dementia.  Arrival to the emergency department patient has no complaints and is calm and cooperative.      History provided by:  Patient, medical records, nursing home and EMS personnel  History limited by:  Dementia   used: No    Altered Mental Status  Presenting symptoms: behavior changes and combativeness    Presenting symptoms: no confusion    Severity:  Moderate  Most recent episode:  Today  Episode history:  Continuous  Duration:  2 days  Timing:  Intermittent  Progression:  Worsening  Chronicity:  Chronic  Context: dementia    Associated symptoms: no abdominal pain, no agitation, no bladder incontinence, no difficulty breathing, no fever, no hallucinations, no headaches, no rash, no seizures, no slurred speech and no visual change        Prior to Admission Medications   Prescriptions Last Dose Informant Patient Reported? Taking?   Lactobacillus 0.05-0.05 MG TABS   Yes No   Sig: Take by mouth   aspirin 81 mg chewable tablet   Yes No   Sig: Chew 81 mg daily   cholecalciferol (VITAMIN D3) 1,000 units tablet   Yes No   Sig: Take 1,000 Units by mouth daily   fluticasone (FLONASE) 50 mcg/act nasal spray   No No   Si spray into each nostril daily   guaiFENesin (MUCINEX) 600 mg 12 hr tablet   No No   Sig: Take 1 tablet (600 mg total) by mouth every 12 (twelve) hours   lisinopril (ZESTRIL) 30 mg tablet   Yes No   Sig: Take 30 mg by mouth daily   memantine (NAMENDA) 5 mg tablet   Yes No   Sig: Take 5 mg by mouth daily   metFORMIN (GLUCOPHAGE) 500 mg tablet   Yes No   Sig: Take 500 mg by mouth daily with  breakfast   sertraline (ZOLOFT) 50 mg tablet   Yes No   Sig: Take 50 mg by mouth daily   simvastatin (ZOCOR) 40 mg tablet   Yes No   Sig: Take 40 mg by mouth daily at bedtime      Facility-Administered Medications: None       Past Medical History:   Diagnosis Date    Abnormality of gait     Aortic ectasia, unspecified site (HCC)     Bile duct proliferation     Chronic kidney disease, stage 3a (HCC)     Essential hypertension, benign     Hyperlipemia     Lack of coordination     Muscle weakness (generalized)     Poorly controlled type 2 diabetes mellitus with circulatory disorder (HCC)     TIA (transient ischemic attack)     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 No pertinent family history.  I have reviewed and agree with the history as documented.    E-Cigarette/Vaping    E-Cigarette Use Never User      E-Cigarette/Vaping Substances    Nicotine No     THC No     CBD No     Flavoring No     Other No     Unknown No      Social History     Tobacco Use    Smoking status: Unknown   Vaping Use    Vaping status: Never Used   Substance Use Topics    Alcohol use: Not Currently    Drug use: Not Currently       Review of Systems   Constitutional:  Negative for activity change, appetite change and fever.   HENT:  Negative for congestion and facial swelling.    Eyes:  Negative for discharge and redness.   Respiratory:  Negative for cough and wheezing.    Cardiovascular:  Negative for chest pain and leg swelling.   Gastrointestinal:  Negative for abdominal distention, abdominal pain and blood in stool.    Endocrine: Negative for cold intolerance and polydipsia.   Genitourinary:  Negative for bladder incontinence, difficulty urinating and hematuria.   Musculoskeletal:  Negative for arthralgias and gait problem.   Skin:  Negative for color change and rash.   Allergic/Immunologic: Negative for food allergies and immunocompromised state.   Neurological:  Negative for dizziness, seizures and headaches.   Hematological:  Negative for adenopathy. Does not bruise/bleed easily.   Psychiatric/Behavioral:  Negative for agitation, confusion, decreased concentration and hallucinations.    All other systems reviewed and are negative.      Physical Exam  Physical Exam  Vitals and nursing note reviewed.   Constitutional:       Appearance: He is well-developed. He is not toxic-appearing.   HENT:      Head: Normocephalic and atraumatic.      Right Ear: Tympanic membrane normal.      Left Ear: Tympanic membrane normal.      Nose: Nose normal.   Eyes:      General: Lids are normal.      Conjunctiva/sclera: Conjunctivae normal.      Pupils: Pupils are equal, round, and reactive to light.   Neck:      Vascular: No carotid bruit or JVD.      Trachea: Trachea normal.   Cardiovascular:      Rate and Rhythm: Normal rate and regular rhythm. No extrasystoles are present.     Heart sounds: Normal heart sounds.   Pulmonary:      Effort: Pulmonary effort is normal.      Breath sounds: No decreased breath sounds, wheezing, rhonchi or rales.   Chest:      Chest wall: No deformity or tenderness.   Abdominal:      General: Bowel sounds are normal.      Palpations: Abdomen is soft.      Tenderness: There is no abdominal tenderness. There is no guarding or rebound.   Musculoskeletal:      Right shoulder: No swelling, deformity or tenderness. Normal range of motion.      Cervical back: Normal range of motion and neck supple. No deformity, tenderness or bony tenderness.   Lymphadenopathy:      Cervical: No cervical adenopathy.   Skin:     General: Skin  is warm and dry.   Neurological:      Mental Status: He is alert.      Cranial Nerves: No cranial nerve deficit.      Sensory: No sensory deficit.      Deep Tendon Reflexes: Reflexes are normal and symmetric.   Psychiatric:         Speech: Speech normal.         Behavior: Behavior normal. Behavior is cooperative.         Vital Signs  ED Triage Vitals [01/26/24 1609]   Temperature Pulse Respirations Blood Pressure SpO2   99.4 °F (37.4 °C) 81 16 149/67 97 %      Temp Source Heart Rate Source Patient Position - Orthostatic VS BP Location FiO2 (%)   Oral -- -- -- --      Pain Score       --           Vitals:    01/26/24 1609   BP: 149/67   Pulse: 81         Visual Acuity      ED Medications  Medications - No data to display    Diagnostic Studies  Results Reviewed       Procedure Component Value Units Date/Time    UA w Reflex to Microscopic w Reflex to Culture [[id number]]  (Normal) Collected: 01/26/24 1744    Lab Status: Final result Specimen: Urine, Clean Catch Updated: 01/26/24 1800     Color, UA Yellow     Clarity, UA Clear     Specific Gravity, UA 1.025     pH, UA 6.0     Leukocytes, UA Negative     Nitrite, UA Negative     Protein, UA Negative mg/dl      Glucose, UA Negative mg/dl      Ketones, UA Negative mg/dl      Urobilinogen, UA 0.2 E.U./dl      Bilirubin, UA Negative     Occult Blood, UA Negative    FLU/RSV/COVID - if FLU/RSV clinically relevant [[id number]]  (Normal) Collected: 01/26/24 1615    Lab Status: Final result Specimen: Nares from Nose Updated: 01/26/24 1745     SARS-CoV-2 Negative     INFLUENZA A PCR Negative     INFLUENZA B PCR Negative     RSV PCR Negative    Narrative:      FOR PEDIATRIC PATIENTS - copy/paste COVID Guidelines URL to browser: [email or web address]    SARS-CoV-2 assay is a Nucleic Acid Amplification assay intended for the  qualitative detection of nucleic acid from SARS-CoV-2 in nasopharyngeal  swabs. Results are for the  presumptive identification of SARS-CoV-2 RNA.    Positive results are indicative of infection with SARS-CoV-2, the virus  causing COVID-19, but do not rule out bacterial infection or co-infection  with other viruses. Laboratories within the [place] and its  territories are required to report all positive results to the appropriate  public health authorities. Negative results do not preclude SARS-CoV-2  infection and should not be used as the sole basis for treatment or other  patient management decisions. Negative results must be combined with  clinical observations, patient history, and epidemiological information.  This test has not been FDA cleared or approved.    This test has been authorized by FDA under an Emergency Use Authorization  (EUA). This test is only authorized for the duration of time the  declaration that circumstances exist justifying the authorization of the  emergency use of an in vitro diagnostic tests for detection of SARS-CoV-2  virus and/or diagnosis of COVID-19 infection under section 564(b)(1) of  the Act, 21 U.S.C. 360bbb-3(b)(1), unless the authorization is terminated  or revoked sooner. The test has been validated but independent review by FDA  and CLIA is pending.    Test performed using [employer] GeneXpert: This RT-PCR assay targets N2,  a region unique to SARS-CoV-2. A conserved region in the E-gene was chosen  for pan-Sarbecovirus detection which includes SARS-CoV-2.    According to CMS-2020-01-R, this platform meets the definition of high-throughput technology.    Comprehensive metabolic panel [[id number]]  (Abnormal) Collected: 01/26/24 1615    Lab Status: Final result Specimen: Blood from Arm, Left Updated: 01/26/24 1645     Sodium 136 mmol/L      Potassium 4.0 mmol/L      Chloride 103 mmol/L      CO2 25 mmol/L      ANION GAP 8 mmol/L      BUN 27 mg/dL      Creatinine 1.16 mg/dL      Glucose 110 mg/dL      Calcium 9.4 mg/dL      AST 25 U/L      ALT 19 U/L      Alkaline  Phosphatase 70 U/L      Total Protein 6.7 g/dL      Albumin 3.8 g/dL      Total Bilirubin 0.28 mg/dL      eGFR 58 ml/min/1.73sq m     Narrative:      National Kidney Disease Foundation guidelines for Chronic Kidney Disease (CKD):     Stage 1 with normal or high GFR (GFR > 90 mL/min/1.73 square meters)    Stage 2 Mild CKD (GFR = 60-89 mL/min/1.73 square meters)    Stage 3A Moderate CKD (GFR = 45-59 mL/min/1.73 square meters)    Stage 3B Moderate CKD (GFR = 30-44 mL/min/1.73 square meters)    Stage 4 Severe CKD (GFR = 15-29 mL/min/1.73 square meters)    Stage 5 End Stage CKD (GFR <15 mL/min/1.73 square meters)  Note: GFR calculation is accurate only with a steady state creatinine    CBC and differential [[id number]]  (Abnormal) Collected: 01/26/24 1615    Lab Status: Final result Specimen: Blood from Arm, Left Updated: 01/26/24 1622     WBC 6.77 Thousand/uL      RBC 3.76 Million/uL      Hemoglobin 11.9 g/dL      Hematocrit 35.9 %      MCV 96 fL      MCH 31.6 pg      MCHC 33.1 g/dL      RDW 13.4 %      MPV 10.3 fL      Platelets 167 Thousands/uL      nRBC 0 /100 WBCs      Neutrophils Relative 56 %      Immat GRANS % 0 %      Lymphocytes Relative 24 %      Monocytes Relative 19 %      Eosinophils Relative 1 %      Basophils Relative 0 %      Neutrophils Absolute 3.72 Thousands/µL      Immature Grans Absolute 0.02 Thousand/uL      Lymphocytes Absolute 1.63 Thousands/µL      Monocytes Absolute 1.30 Thousand/µL      Eosinophils Absolute 0.08 Thousand/µL      Basophils Absolute 0.02 Thousands/µL                    CT head without contrast   Final Result by Johny Helton MD (01/26 1706)      -No acute intracranial abnormality. Moderate microangiopathic changes and lacunar infarcts.      -Encephalomalacia involving the anterior right frontal lobe, nonspecific and could be due to prior infarct versus trauma among other etiologies.                  Workstation performed: [id number]                    Procedures  ECG 12 Lead  Documentation Only    Date/Time: 1/26/2024 4:20 PM    Performed by: Teresa Douglas DO  Authorized by: Teresa Douglas DO    Patient location:  ED  Rate:     ECG rate:  85  Rhythm:     Rhythm: sinus rhythm    Conduction:     Conduction: abnormal      Abnormal conduction: complete RBBB             ED Course                                             Medical Decision Making  Differential diagnosis includes but is not limited to dementia exacerbation, UTI, electrolyte abnormality, anemia, CHAIM, arrhythmia, closed head injury,    Problems Addressed:  Vascular dementia without behavioral disturbance, psychotic disturbance, mood disturbance, or anxiety, unspecified dementia severity (HCC): chronic illness or injury with exacerbation, progression, or side effects of treatment    Amount and/or Complexity of Data Reviewed  Labs: ordered. Decision-making details documented in ED Course.  Radiology: ordered. Decision-making details documented in ED Course.  ECG/medicine tests: ordered and independent interpretation performed. Decision-making details documented in ED Course.    Risk  Prescription drug management.  Decision regarding hospitalization.  Risk Details: Continue all current medications.  Patient has no acute abnormalities on workup today.  Do not feel he needs admission to the hospital at this time.             Disposition  Final diagnoses:   Vascular dementia without behavioral disturbance, psychotic disturbance, mood disturbance, or anxiety, unspecified dementia severity (HCC)     Time reflects when diagnosis was documented in both MDM as applicable and the Disposition within this note       Time User Action Codes Description Comment    1/26/2024  6:08 PM Teresa Douglas Add [F01.50] Vascular dementia without behavioral disturbance, psychotic disturbance, mood disturbance, or anxiety, unspecified dementia severity (HCC)           ED Disposition       ED Disposition   Discharge    Condition   Stable     Date/Time   Fri Jan 26, 2024  6:08 PM    Comment   Valentino Pederson discharge to home/self care.                   Follow-up Information       Follow up With Specialties Details Why Contact Info    Ilia Simon MD Internal Medicine   [address] [place], [place] [id number] [phone]              Patient's Medications   Discharge Prescriptions    No medications on file       No discharge procedures on file.    PDMP Review       None            ED Provider  Electronically Signed by             Teresa Douglas DO  01/26/24 1952

## 2024-01-27 NOTE — ED NOTES
Pt offered bathroom and provided food, bed alarm placed on bed for patient safety.     Kasia Song RN  01/26/24 2011

## 2024-01-30 LAB
ATRIAL RATE: 86 BPM
MYCOBACTERIUM SPEC CULT: NORMAL
P AXIS: 71 DEGREES
PR INTERVAL: 174 MS
QRS AXIS: 68 DEGREES
QRSD INTERVAL: 134 MS
QT INTERVAL: 394 MS
QTC INTERVAL: 471 MS
RHODAMINE-AURAMINE STN SPEC: NORMAL
T WAVE AXIS: 65 DEGREES
VENTRICULAR RATE: 86 BPM

## 2024-02-01 ENCOUNTER — TELEPHONE (OUTPATIENT)
Dept: OBGYN CLINIC | Facility: CLINIC | Age: 84
End: 2024-02-01

## 2024-02-01 NOTE — TELEPHONE ENCOUNTER
Called facility patient is living in. They stated Valentino can't go out of the facility for any appointments due to his care in the home.     ----- Message from Max Ayala MD sent at 1/30/2024  8:53 AM EST -----  Please call and schedule postop appt for finger amputation. Thank you.

## 2024-04-08 ENCOUNTER — HOSPITAL ENCOUNTER (OUTPATIENT)
Dept: CT IMAGING | Facility: HOSPITAL | Age: 84
Discharge: HOME | End: 2024-04-08
Payer: COMMERCIAL

## 2024-04-08 DIAGNOSIS — R29.6 RECURRENT FALLS: ICD-10-CM

## 2024-04-08 PROCEDURE — 70450 CT HEAD/BRAIN W/O DYE: CPT

## 2024-04-30 ENCOUNTER — TELEPHONE (OUTPATIENT)
Age: 84
End: 2024-04-30

## 2024-04-30 DIAGNOSIS — G91.2 NORMAL PRESSURE HYDROCEPHALUS (HCC): Primary | ICD-10-CM

## 2024-04-30 NOTE — TELEPHONE ENCOUNTER
His RN phoned the patient son, Michael to determine if appointments for NPH should be scheduled with his or with the facility of [facility].  Patient asked that appointments be scheduled with the facility as he lie in [place].  Michael did request that he be emailed updated information on scheduling to [email or web address] [email or web address].  This RN in agreement to email info.     Facility was called by this RN who spike with sheila Robles for scheduling.  Pt assessment appointment made for 5/21/1979 for 0900 and follow up appointment danya orourke with Dr., Goldberg for 0945.  Margaret was phoned back and given date, time and location of appointments as well as duration.  Margaret was appreciative for assistance.     Michael emailed with this information as well.

## 2024-05-21 ENCOUNTER — OFFICE VISIT (OUTPATIENT)
Dept: NEUROSURGERY | Facility: CLINIC | Age: 84
End: 2024-05-21
Payer: COMMERCIAL

## 2024-05-21 ENCOUNTER — OFFICE VISIT (OUTPATIENT)
Dept: PHYSICAL THERAPY | Facility: CLINIC | Age: 84
End: 2024-05-21
Payer: COMMERCIAL

## 2024-05-21 VITALS
BODY MASS INDEX: 21.34 KG/M2 | OXYGEN SATURATION: 96 % | HEART RATE: 84 BPM | DIASTOLIC BLOOD PRESSURE: 88 MMHG | HEIGHT: 71 IN | SYSTOLIC BLOOD PRESSURE: 138 MMHG | TEMPERATURE: 98 F

## 2024-05-21 DIAGNOSIS — G91.2 NORMAL PRESSURE HYDROCEPHALUS (HCC): Primary | ICD-10-CM

## 2024-05-21 DIAGNOSIS — G91.2 NPH (NORMAL PRESSURE HYDROCEPHALUS) (HCC): ICD-10-CM

## 2024-05-21 PROCEDURE — 99204 OFFICE O/P NEW MOD 45 MIN: CPT | Performed by: NEUROLOGICAL SURGERY

## 2024-05-21 PROCEDURE — 97161 PT EVAL LOW COMPLEX 20 MIN: CPT | Performed by: PHYSICAL THERAPIST

## 2024-05-21 NOTE — PROGRESS NOTES
PT Evaluation and discharge note    Today's date: 2024  Patient name: Valentino Pederson  : 1940  MRN: [id number]  Referring provider: Goldberg, Craig Robert,*  Dx:   Encounter Diagnosis     ICD-10-CM    1. Normal pressure hydrocephalus (HCC)  G91.2 Ambulatory referral to Physical Therapy              Subjective Evaluation     History of Present Illness    Patient is an 84 yo Male from  [facility] at the [facility] for a gait evaluation and will follow up with Neurosurgery for NPH consult. He was admitted here in November and has had been to the ER here in January due to a fall. He presents with an aide from the [facility] facility who was able to answer some questions. She states he was able to walk before when he was admitted in 2023 but now he is not able to. He was able to speak but not able to coherently answer questions. He has hx of Dementia with mood disturbance.     Neuro signs: balance, memory loss  Red flags: none        Pain    At worst pain rating:  unable to answer    Social Support  Lives in [facility] facility  Michael Pederson is his [phone]          STGs  1. Safely ambulate       LTGs  1. Will assess prn        Objective Measurements:  A& Ox 0  Unable to have a conversation  Gait:unsafe gait c RW and max assist 10 ft  Gait speed: unsafe to perform   TUG:not completed 60 sec c max assist RW and w/c followed  FGA 0  MOCA: 0          Assessment:    Please refer to the NPH Exam form for all objective measures. Patient was seen for a gait and balance assessment and presents with max fall risk.  She will be seen for re-assessment of her gait/balance as needed as part of the management of their condition. He does not seem as an appropriate candidate for NPH clinic treatment as he is unable to communicate due to hx of dementia, incoherent speech, and inability to perform examinations: [place], TUG, FGA.       Impairments include: Decreased balance, Decreased  endurance, Gait deficits          Plan  Patient would benefit from:continued care in memory care    Treatment plan discussed with: patient    Please contact me if you have any further questions or recommendations. Thank you very much for the kind referral.

## 2024-05-21 NOTE — PROGRESS NOTES
Review of Systems   Unable to perform ROS: Dementia   Constitutional: Negative.    HENT: Negative.     Eyes: Negative.    Respiratory: Negative.     Cardiovascular: Negative.    Gastrointestinal: Negative.    Endocrine: Negative.    Genitourinary: Negative.    Musculoskeletal: Negative.    Skin: Negative.    Allergic/Immunologic: Negative.    Neurological: Negative.         NPH-- CTH on 4/8/24    Confusion and UA Incontinence  [facility] [place] [phone]       Patient could not do MOCA      Hematological: Negative.    Psychiatric/Behavioral: Negative.     All other systems reviewed and are negative.         Current Outpatient Medications:     aspirin 81 mg chewable tablet, Chew 81 mg daily, Disp: , Rfl:     cholecalciferol (VITAMIN D3) 1,000 units tablet, Take 1,000 Units by mouth daily, Disp: , Rfl:     fluticasone (FLONASE) 50 mcg/act nasal spray, 1 spray into each nostril daily, Disp: , Rfl:     guaiFENesin (MUCINEX) 600 mg 12 hr tablet, Take 1 tablet (600 mg total) by mouth every 12 (twelve) hours, Disp: , Rfl:     Lactobacillus 0.05-0.05 MG TABS, Take by mouth, Disp: , Rfl:     lisinopril (ZESTRIL) 30 mg tablet, Take 30 mg by mouth daily, Disp: , Rfl:     memantine (NAMENDA) 5 mg tablet, Take 5 mg by mouth daily, Disp: , Rfl:     metFORMIN (GLUCOPHAGE) 500 mg tablet, Take 500 mg by mouth daily with breakfast, Disp: , Rfl:     sertraline (ZOLOFT) 50 mg tablet, Take 50 mg by mouth daily, Disp: , Rfl:     simvastatin (ZOCOR) 40 mg tablet, Take 40 mg by mouth daily at bedtime, Disp: , Rfl:

## 2024-05-21 NOTE — PROGRESS NOTES
"Neurosurgery   Valentino Pederson 83 y.o. male MRN: [id number]      Assessment & Plan   Dementia.  Cerebral Atrophy.  There is significant widening of both Sylvian fissures and convexity sulci c/w atrophy.  The patient has known vascular dementia and aggressive behaviour.  The index of 0.36 is only marginally above the 0.30 threshold.  He resides in a SNF  He does not have normal pressure hydrocephalus.  I would not plan any additional workup for this for him.  I would not subject him to an LP  He is not a candidate for a  shunt.    Chief Complaint:  sent here by ED based on radiologist interpretation of CT    HPI  Patient unable to articulate history  Oriented to person only  Verbalizes in words, but non-sensical sentences  Taken to ED after falls at SNF  sent here by ED based on radiologist interpretation of CT    ROS  ROS personally reviewed and updated.  Review of Systems    Vitals:    /88   Pulse 84   Temp 98 °F (36.7 °C) (Temporal)   Ht 5' 11\" (1.803 m)   SpO2 96%   BMI 21.34 kg/m²     Past Medical History:   Diagnosis Date   • Abnormality of gait    • Aortic ectasia, unspecified site (HCC)    • Bile duct proliferation    • Chronic kidney disease, stage 3a ([facility])    • Essential hypertension, benign    • Hyperlipemia    • Lack of coordination    • Muscle weakness (generalized)    • Poorly controlled type 2 diabetes mellitus with circulatory disorder ([facility])    • TIA (transient ischemic attack)    • 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) ([facility])    • Vascular dementia, moderate, without behavioral disturbance, psychotic disturbance, mood disturbance, and anxiety ([facility])    • Vitamin D deficiency disease        Past Surgical History:   Procedure Laterality Date   • FINGER AMPUTATION Left 12/13/2023    Procedure: AMPUTATION FINGER - LEFT MIDDLE;  Surgeon: Max Ayala MD;  Location:  MAIN OR;  Service: Orthopedics         Current Outpatient Medications:   •  aspirin 81 mg chewable " tablet, Chew 81 mg daily, Disp: , Rfl:   •  cholecalciferol (VITAMIN D3) 1,000 units tablet, Take 1,000 Units by mouth daily, Disp: , Rfl:   •  fluticasone (FLONASE) 50 mcg/act nasal spray, 1 spray into each nostril daily, Disp: , Rfl:   •  guaiFENesin (MUCINEX) 600 mg 12 hr tablet, Take 1 tablet (600 mg total) by mouth every 12 (twelve) hours, Disp: , Rfl:   •  Lactobacillus 0.05-0.05 MG TABS, Take by mouth, Disp: , Rfl:   •  lisinopril (ZESTRIL) 30 mg tablet, Take 30 mg by mouth daily, Disp: , Rfl:   •  memantine (NAMENDA) 5 mg tablet, Take 5 mg by mouth daily, Disp: , Rfl:   •  metFORMIN (GLUCOPHAGE) 500 mg tablet, Take 500 mg by mouth daily with breakfast, Disp: , Rfl:   •  sertraline (ZOLOFT) 50 mg tablet, Take 50 mg by mouth daily, Disp: , Rfl:   •  simvastatin (ZOCOR) 40 mg tablet, Take 40 mg by mouth daily at bedtime, Disp: , Rfl:       Allergies   Allergen Reactions   • Pollen Extract Hives       Social History     Socioeconomic History   • Marital status: Unknown     Spouse name: Not on file   • Number of children: Not on file   • Years of education: Not on file   • Highest education level: Not on file   Occupational History   • Not on file   Tobacco Use   • Smoking status: Unknown   • Smokeless tobacco: Not on file   Vaping Use   • Vaping status: Never Used   Substance and Sexual Activity   • Alcohol use: Not Currently   • Drug use: Not Currently   • Sexual activity: Not Currently   Other Topics Concern   • Not on file   Social History Narrative   • Not on file     Social Determinants of Health     Financial Resource Strain: Not on file   Food Insecurity: Patient Unable To Answer (12/13/2023)    Hunger Vital Sign    • Worried About Running Out of Food in the Last Year: Patient unable to answer    • Ran Out of Food in the Last Year: Patient unable to answer   Transportation Needs: Patient Unable To Answer (12/13/2023)    PRAPARE - Transportation    • Lack of Transportation (Medical): Patient unable to  answer    • Lack of Transportation (Non-Medical): Patient unable to answer   Physical Activity: Not on file   Stress: Not on file   Social Connections: Not on file   Intimate Partner Violence: Not on file   Housing Stability: Patient Unable To Answer (12/13/2023)    Housing Stability Vital Sign    • Unable to Pay for Housing in the Last Year: Patient unable to answer    • Number of Places Lived in the Last Year: 1    • Unstable Housing in the Last Year: Patient unable to answer            Imaging:  CT Images and Report of the Brain show atrophy.  There is significant widening of both Sylvian fissures and convexity sulci.  The patient has known vascular dementia and aggressive behavior.  The index of 0.36 is only marginally above the 0.30 threshold.      Physical Exam    Well-developed, disheveled  Appears stated age of 83 y.o.  Awake alert oriented x1  Verbally interactive and mostly appropriate  In wheelchair

## 2024-05-22 ENCOUNTER — HOSPITAL ENCOUNTER (EMERGENCY)
Facility: HOSPITAL | Age: 84
Discharge: HOME | End: 2024-05-23
Attending: EMERGENCY MEDICINE
Payer: COMMERCIAL

## 2024-05-22 VITALS
SYSTOLIC BLOOD PRESSURE: 119 MMHG | RESPIRATION RATE: 18 BRPM | HEART RATE: 70 BPM | OXYGEN SATURATION: 100 % | DIASTOLIC BLOOD PRESSURE: 61 MMHG | TEMPERATURE: 98.5 F

## 2024-05-22 DIAGNOSIS — R11.2 NAUSEA AND VOMITING: Primary | ICD-10-CM

## 2024-05-22 DIAGNOSIS — N17.9 AKI (ACUTE KIDNEY INJURY) (HCC): ICD-10-CM

## 2024-05-22 LAB
ALBUMIN SERPL BCP-MCNC: 3.9 G/DL (ref 3.5–5)
ALP SERPL-CCNC: 59 U/L (ref 34–104)
ALT SERPL W P-5'-P-CCNC: 38 U/L (ref 7–52)
ANION GAP SERPL CALCULATED.3IONS-SCNC: 9 MMOL/L (ref 4–13)
AST SERPL W P-5'-P-CCNC: 33 U/L (ref 13–39)
BASOPHILS # BLD AUTO: 0.02 THOUSANDS/ÂΜL (ref 0–0.1)
BASOPHILS NFR BLD AUTO: 0 % (ref 0–1)
BILIRUB SERPL-MCNC: 0.3 MG/DL (ref 0.2–1)
BUN SERPL-MCNC: 45 MG/DL (ref 5–25)
CALCIUM SERPL-MCNC: 9.5 MG/DL (ref 8.4–10.2)
CHLORIDE SERPL-SCNC: 104 MMOL/L (ref 96–108)
CO2 SERPL-SCNC: 27 MMOL/L (ref 21–32)
CREAT SERPL-MCNC: 2 MG/DL (ref 0.6–1.3)
EOSINOPHIL # BLD AUTO: 0.03 THOUSAND/ÂΜL (ref 0–0.61)
EOSINOPHIL NFR BLD AUTO: 0 % (ref 0–6)
ERYTHROCYTE [DISTWIDTH] IN BLOOD BY AUTOMATED COUNT: 14.1 % (ref 11.6–15.1)
GFR SERPL CREATININE-BSD FRML MDRD: 30 ML/MIN/1.73SQ M
GLUCOSE SERPL-MCNC: 142 MG/DL (ref 65–140)
HCT VFR BLD AUTO: 32.1 % (ref 36.5–49.3)
HGB BLD-MCNC: 10.7 G/DL (ref 12–17)
IMM GRANULOCYTES # BLD AUTO: 0.05 THOUSAND/UL (ref 0–0.2)
IMM GRANULOCYTES NFR BLD AUTO: 0 % (ref 0–2)
LIPASE SERPL-CCNC: 78 U/L (ref 11–82)
LYMPHOCYTES # BLD AUTO: 1.72 THOUSANDS/ÂΜL (ref 0.6–4.47)
LYMPHOCYTES NFR BLD AUTO: 15 % (ref 14–44)
MCH RBC QN AUTO: 31.2 PG (ref 26.8–34.3)
MCHC RBC AUTO-ENTMCNC: 33.3 G/DL (ref 31.4–37.4)
MCV RBC AUTO: 94 FL (ref 82–98)
MONOCYTES # BLD AUTO: 1.13 THOUSAND/ÂΜL (ref 0.17–1.22)
MONOCYTES NFR BLD AUTO: 10 % (ref 4–12)
NEUTROPHILS # BLD AUTO: 8.8 THOUSANDS/ÂΜL (ref 1.85–7.62)
NEUTS SEG NFR BLD AUTO: 75 % (ref 43–75)
NRBC BLD AUTO-RTO: 0 /100 WBCS
PLATELET # BLD AUTO: 239 THOUSANDS/UL (ref 149–390)
PMV BLD AUTO: 10.3 FL (ref 8.9–12.7)
POTASSIUM SERPL-SCNC: 4.8 MMOL/L (ref 3.5–5.3)
PROT SERPL-MCNC: 6.7 G/DL (ref 6.4–8.4)
RBC # BLD AUTO: 3.43 MILLION/UL (ref 3.88–5.62)
SODIUM SERPL-SCNC: 140 MMOL/L (ref 135–147)
WBC # BLD AUTO: 11.75 THOUSAND/UL (ref 4.31–10.16)

## 2024-05-22 PROCEDURE — 85025 COMPLETE CBC W/AUTO DIFF WBC: CPT | Performed by: EMERGENCY MEDICINE

## 2024-05-22 PROCEDURE — 99283 EMERGENCY DEPT VISIT LOW MDM: CPT

## 2024-05-22 PROCEDURE — 80053 COMPREHEN METABOLIC PANEL: CPT | Performed by: EMERGENCY MEDICINE

## 2024-05-22 PROCEDURE — 96361 HYDRATE IV INFUSION ADD-ON: CPT

## 2024-05-22 PROCEDURE — 36415 COLL VENOUS BLD VENIPUNCTURE: CPT | Performed by: EMERGENCY MEDICINE

## 2024-05-22 PROCEDURE — 96360 HYDRATION IV INFUSION INIT: CPT

## 2024-05-22 PROCEDURE — 83690 ASSAY OF LIPASE: CPT | Performed by: EMERGENCY MEDICINE

## 2024-05-22 PROCEDURE — 99285 EMERGENCY DEPT VISIT HI MDM: CPT | Performed by: EMERGENCY MEDICINE

## 2024-05-22 RX ADMIN — SODIUM CHLORIDE 1000 ML: 0.9 INJECTION, SOLUTION INTRAVENOUS at 22:55

## 2024-05-23 PROCEDURE — 96361 HYDRATE IV INFUSION ADD-ON: CPT

## 2024-05-23 NOTE — ED NOTES
PO fluids encouraged;  Pt given and tolerating small sips at this time without issue.     Sergio Smith RN  05/22/24 4208

## 2024-05-23 NOTE — ED PROVIDER NOTES
History  Chief Complaint   Patient presents with    Vomiting     Presents from [facility] for vomiting.  Received 4mg zofran prior to EM.  Symptoms resolved upon arrival to ED.  Pt poor historian and has no complaints at this time.     83-year-old male sent in from long-term care for vomiting.  Apparently the patient had an episode of vomiting this evening received 4 mg of Zofran and is no longer vomiting.  According to EMS he only vomited food there was no blood in his vomit.  Patient is a poor historian and has advanced dementia he has no complaints at this time.  He is tolerating p.o. liquids.  Vital signs are within normal limits we will check lab work.      History provided by:  Nursing home, EMS personnel and medical records  History limited by:  Dementia   used: No    Vomiting  Severity:  Unable to specify  Timing:  Unable to specify  Quality:  Stomach contents  Progression:  Resolved  Chronicity:  New  Recent urination:  Normal  Relieved by:  Antiemetics  Associated symptoms: no abdominal pain, no cough, no diarrhea and no fever        Prior to Admission Medications   Prescriptions Last Dose Informant Patient Reported? Taking?   Lactobacillus 0.05-0.05 MG TABS   Yes No   Sig: Take by mouth   aspirin 81 mg chewable tablet   Yes No   Sig: Chew 81 mg daily   cholecalciferol (VITAMIN D3) 1,000 units tablet   Yes No   Sig: Take 1,000 Units by mouth daily   fluticasone (FLONASE) 50 mcg/act nasal spray   No No   Si spray into each nostril daily   guaiFENesin (MUCINEX) 600 mg 12 hr tablet   No No   Sig: Take 1 tablet (600 mg total) by mouth every 12 (twelve) hours   lisinopril (ZESTRIL) 30 mg tablet   Yes No   Sig: Take 30 mg by mouth daily   memantine (NAMENDA) 5 mg tablet   Yes No   Sig: Take 5 mg by mouth daily   metFORMIN (GLUCOPHAGE) 500 mg tablet   Yes No   Sig: Take 500 mg by mouth daily with breakfast   sertraline (ZOLOFT) 50 mg tablet   Yes No   Sig: Take 50 mg by mouth daily   simvastatin  (ZOCOR) 40 mg tablet   Yes No   Sig: Take 40 mg by mouth daily at bedtime      Facility-Administered Medications: None       Past Medical History:   Diagnosis Date    Abnormality of gait     Aortic ectasia, unspecified site (HCC)     Bile duct proliferation     Chronic kidney disease, stage 3a (HCC)     Essential hypertension, benign     Hyperlipemia     Lack of coordination     Muscle weakness (generalized)     Poorly controlled type 2 diabetes mellitus with circulatory disorder (HCC)     TIA (transient ischemic attack)     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  I have reviewed and agree with the history as documented.    E-Cigarette/Vaping    E-Cigarette Use Never User      E-Cigarette/Vaping Substances    Nicotine No     THC No     CBD No     Flavoring No     Other No     Unknown No      Social History     Tobacco Use    Smoking status: Unknown   Vaping Use    Vaping status: Never Used   Substance Use Topics    Alcohol use: Not Currently    Drug use: Not Currently       Review of Systems   Unable to perform ROS: Dementia   Constitutional:  Negative for fever.   Respiratory:  Negative for cough.    Gastrointestinal:  Positive for vomiting. Negative for abdominal pain and diarrhea.       Physical Exam  Physical Exam  Vitals and nursing note reviewed.   Constitutional:       General: He is not in acute distress.     Appearance: He is well-developed.   HENT:      Head: Normocephalic and atraumatic.   Eyes:      Conjunctiva/sclera: Conjunctivae normal.   Cardiovascular:      Rate and  Rhythm: Normal rate and regular rhythm.      Heart sounds: No murmur heard.  Pulmonary:      Effort: Pulmonary effort is normal. No respiratory distress.      Breath sounds: Normal breath sounds.   Abdominal:      Palpations: Abdomen is soft.      Tenderness: There is no abdominal tenderness.   Musculoskeletal:         General: No swelling.      Cervical back: Neck supple.   Skin:     General: Skin is warm and dry.      Capillary Refill: Capillary refill takes less than 2 seconds.   Neurological:      Mental Status: He is alert. Mental status is at baseline. He is disoriented.   Psychiatric:         Cognition and Memory: Memory is impaired.         Vital Signs  ED Triage Vitals   Temperature Pulse Respirations Blood Pressure SpO2   05/22/24 2202 05/22/24 2200 05/22/24 2200 05/22/24 2200 05/22/24 2200   98.5 °F (36.9 °C) 70 18 119/61 100 %      Temp Source Heart Rate Source Patient Position - Orthostatic VS BP Location FiO2 (%)   05/22/24 2200 05/22/24 2200 05/22/24 2200 05/22/24 2200 --   Axillary Monitor Lying Right arm       Pain Score       --                  Vitals:    05/22/24 2200   BP: 119/61   Pulse: 70   Patient Position - Orthostatic VS: Lying         Visual Acuity  Visual Acuity      Flowsheet Row Most Recent Value   L Pupil Size (mm) 3   R Pupil Size (mm) 3            ED Medications  Medications   sodium chloride 0.9 % bolus 1,000 mL (1,000 mL Intravenous New Bag 5/22/24 2255)       Diagnostic Studies  Results Reviewed       Procedure Component Value Units Date/Time    Comprehensive metabolic panel [[id number]]  (Abnormal) Collected: 05/22/24 2201    Lab Status: Final result Specimen: Blood from Arm, Left Updated: 05/22/24 2224     Sodium 140 mmol/L      Potassium 4.8 mmol/L      Chloride 104 mmol/L      CO2 27 mmol/L      ANION GAP 9 mmol/L      BUN 45 mg/dL      Creatinine 2.00 mg/dL      Glucose 142 mg/dL      Calcium 9.5 mg/dL      AST 33 U/L      ALT 38 U/L      Alkaline Phosphatase 59 U/L       Total Protein 6.7 g/dL      Albumin 3.9 g/dL      Total Bilirubin 0.30 mg/dL      eGFR 30 ml/min/1.73sq m     Narrative:      National Kidney Disease Foundation guidelines for Chronic Kidney Disease (CKD):     Stage 1 with normal or high GFR (GFR > 90 mL/min/1.73 square meters)    Stage 2 Mild CKD (GFR = 60-89 mL/min/1.73 square meters)    Stage 3A Moderate CKD (GFR = 45-59 mL/min/1.73 square meters)    Stage 3B Moderate CKD (GFR = 30-44 mL/min/1.73 square meters)    Stage 4 Severe CKD (GFR = 15-29 mL/min/1.73 square meters)    Stage 5 End Stage CKD (GFR <15 mL/min/1.73 square meters)  Note: GFR calculation is accurate only with a steady state creatinine    Lipase [[id number]]  (Normal) Collected: 05/22/24 2201    Lab Status: Final result Specimen: Blood from Arm, Left Updated: 05/22/24 2224     Lipase 78 u/L     CBC and differential [[id number]]  (Abnormal) Collected: 05/22/24 2201    Lab Status: Final result Specimen: Blood from Arm, Left Updated: 05/22/24 2208     WBC 11.75 Thousand/uL      RBC 3.43 Million/uL      Hemoglobin 10.7 g/dL      Hematocrit 32.1 %      MCV 94 fL      MCH 31.2 pg      MCHC 33.3 g/dL      RDW 14.1 %      MPV 10.3 fL      Platelets 239 Thousands/uL      nRBC 0 /100 WBCs      Segmented % 75 %      Immature Grans % 0 %      Lymphocytes % 15 %      Monocytes % 10 %      Eosinophils Relative 0 %      Basophils Relative 0 %      Absolute Neutrophils 8.80 Thousands/µL      Absolute Immature Grans 0.05 Thousand/uL      Absolute Lymphocytes 1.72 Thousands/µL      Absolute Monocytes 1.13 Thousand/µL      Eosinophils Absolute 0.03 Thousand/µL      Basophils Absolute 0.02 Thousands/µL                    No orders to display              Procedures  Procedures         ED Course  ED Course as of 05/22/24 2351   Wed May 22, 2024   2222 WBC(!): 11.75  Mildly elevated white count most likely secondary to vomiting   2222 Hemoglobin(!): 10.7  Patient has a history of anemia most recent hemoglobin was 11.8.   No blood in vomit.   2252 Creatinine(!): 2.00  Elevated from baseline of 1.  Patient does come with a POLST that states they want very limited intervention including no IV hydration.  However I called and discussed the vomiting episode with his son Michael.  Michael and is agreement with giving patient a liter of fluid here in the emergency department to help his hydration status is also able to take p.o. liquids here.  Family would prefer that he is not admitted to the hospital.  Discussed that this is most likely something he ate or a gastroenteritis.  Family's biggest concern is to keep patient calm and comfortable.  Patient is sitting in his room calm he is drinking water and states I am [place] the beach                               SBIRT 20yo+      Flowsheet Row Most Recent Value   Initial Alcohol Screen: US AUDIT-C     1. How often do you have a drink containing alcohol? 0 Filed at: 05/22/2024 2213   2. How many drinks containing alcohol do you have on a typical day you are drinking?  0 Filed at: 05/22/2024 2213   3b. FEMALE Any Age, or MALE 65+: How often do you have 4 or more drinks on one occassion? 0 Filed at: 05/22/2024 2213   Audit-C Score 0 Filed at: 05/22/2024 2213   ILYA: How many times in the past year have you...    Used an illegal drug or used a prescription medication for non-medical reasons? Never Filed at: 05/22/2024 2213                      Medical Decision Making  Differential diagnosis includes but is not limited to gastritis, gastroenteritis, food poisoning, viral illness, electrolyte abnormality, dehydration, CHAIM,    Problems Addressed:  CHAIM (acute kidney injury) (HCC): acute illness or injury     Details: Discussed with family they do not want him admitted to the hospital will give him fluids here to rehydrate him  Nausea and vomiting: acute illness or injury     Details: Resolved after Zofran    Amount and/or Complexity of Data Reviewed  Labs: ordered.    Risk  Decision  regarding hospitalization.  Risk Details: Family does not want patient admitted to the hospital at this time.             Disposition  Final diagnoses:   Nausea and vomiting   CHAIM (acute kidney injury) ([facility])     Time reflects when diagnosis was documented in both MDM as applicable and the Disposition within this note       Time User Action Codes Description Comment    5/22/2024 11:48 PM Teresa Douglas Add [R11.2] Nausea and vomiting     5/22/2024 11:50 PM Teresa Douglas Add [N17.9] CHAIM (acute kidney injury) ([facility])           ED Disposition       ED Disposition   Discharge    Condition   Stable    Date/Time   Wed May 22, 2024 1207    Comment   Valentino Pederson discharge to home/self care.                   Follow-up Information       Follow up With Specialties Details Why Contact Info    Ilia Simon MD Internal Medicine Schedule an appointment as soon as possible for a visit  As needed [address] [place] [address]  [phone]              Patient's Medications   Discharge Prescriptions    No medications on file       No discharge procedures on file.    PDMP Review       None            ED Provider  Electronically Signed by             Teresa Douglas DO  05/22/24 3775

## 2024-06-14 ENCOUNTER — APPOINTMENT (OUTPATIENT)
Dept: RADIOLOGY | Facility: AMBULARY SURGERY CENTER | Age: 84
End: 2024-06-14
Attending: ORTHOPAEDIC SURGERY
Payer: COMMERCIAL

## 2024-06-14 DIAGNOSIS — M75.121 COMPLETE TEAR OF RIGHT ROTATOR CUFF, UNSPECIFIED WHETHER TRAUMATIC: ICD-10-CM

## 2024-06-14 DIAGNOSIS — M19.011 PRIMARY OSTEOARTHRITIS OF RIGHT SHOULDER: Primary | ICD-10-CM

## 2024-06-14 DIAGNOSIS — M25.511 RIGHT SHOULDER PAIN, UNSPECIFIED CHRONICITY: ICD-10-CM

## 2024-06-14 PROCEDURE — 20610 DRAIN/INJ JOINT/BURSA W/O US: CPT | Performed by: ORTHOPAEDIC SURGERY

## 2024-06-14 PROCEDURE — 99214 OFFICE O/P EST MOD 30 MIN: CPT | Performed by: ORTHOPAEDIC SURGERY

## 2024-06-14 PROCEDURE — 73030 X-RAY EXAM OF SHOULDER: CPT

## 2024-06-14 RX ORDER — BETAMETHASONE SODIUM PHOSPHATE AND BETAMETHASONE ACETATE 3; 3 MG/ML; MG/ML
9 INJECTION, SUSPENSION INTRA-ARTICULAR; INTRALESIONAL; INTRAMUSCULAR; SOFT TISSUE
Status: COMPLETED | OUTPATIENT
Start: 2024-06-14 | End: 2024-06-14

## 2024-06-14 RX ORDER — BUPIVACAINE HYDROCHLORIDE 2.5 MG/ML
1.5 INJECTION, SOLUTION INFILTRATION; PERINEURAL
Status: COMPLETED | OUTPATIENT
Start: 2024-06-14 | End: 2024-06-14

## 2024-06-14 RX ADMIN — BETAMETHASONE SODIUM PHOSPHATE AND BETAMETHASONE ACETATE 9 MG: 3; 3 INJECTION, SUSPENSION INTRA-ARTICULAR; INTRALESIONAL; INTRAMUSCULAR; SOFT TISSUE at 08:15

## 2024-06-14 RX ADMIN — BUPIVACAINE HYDROCHLORIDE 1.5 ML: 2.5 INJECTION, SOLUTION INFILTRATION; PERINEURAL at 08:15

## 2024-06-14 NOTE — PROGRESS NOTES
Assessment:  1. Right shoulder pain, unspecified chronicity  XR shoulder 2+ vw right      2. Primary osteoarthritis of right shoulder        3. 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 male with severe right shoulder osteoarthritis, suspected chronic rotator cuff tear, severe bilateral upper extremity distal muscle atrophy and multiple finger flexion contractures   Diagnostics reviewed and physical exam performed.  Diagnosis, treatment options and associated risks were discussed with the patient including no treatment, nonsurgical treatment and potential for surgical intervention.  The patient was given the opportunity to ask questions regarding each.  Patient was offered, accepted, and received a cortisone injection of the right shoulder. Risks and benefits of CSI were discussed with the patient. The corticosteroid injection was administered without any immediate complication and was well tolerated by the patient. This was done under sterile technique. Post injection instructions and expectations were discussed. It was explained to the patient that cortisone injections can be repeated as early as every 3 months.   Recommend follow up with Neurology vs Spine and Pain for further evaluation and treatment of likely cervical spine pathology  Return for follow up on an as-needed basis (PRN).          Subjective:     Patient ID: Valentino Pederson 83 y.o. male    HPI    Patient presents today for initial evaluation of right shoulder pain. He is mostly nonverbal and has a caretaker with him from his facility who was able to provide history. They note pain when they lift his arm to change his shirt. Unsure if he has had any frequent falls. He receives Tylenol as needed for  pain.       The following portions of the patient's history were reviewed and updated as appropriate: allergies, current medications, past family history, past social history, past surgical history and problem list.      Objective:    Review of Systems  Pertinent items are noted in HPI.  All other systems were reviewed and are negative.    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 No pertinent family history.    Social History     Occupational History    Not on file   Tobacco Use    Smoking status: Unknown    Smokeless tobacco: Not on file   Vaping Use    Vaping status: Never Used   Substance and Sexual Activity    Alcohol use: Not Currently    Drug use: Not Currently    Sexual activity: Not Currently         Current Outpatient Medications:     aspirin 81 mg chewable tablet, Chew 81 mg daily, Disp: , Rfl:     cholecalciferol (VITAMIN D3) 1,000 units tablet, Take 1,000 Units by mouth daily, Disp: , Rfl:     fluticasone (FLONASE) 50 mcg/act nasal spray, 1 spray into each nostril daily, Disp: , Rfl:     guaiFENesin (MUCINEX) 600 mg 12 hr tablet, Take 1 tablet (600 mg total) by mouth every 12 (twelve)  "hours, Disp: , Rfl:     Lactobacillus 0.05-0.05 MG TABS, Take by mouth, Disp: , Rfl:     lisinopril (ZESTRIL) 30 mg tablet, Take 30 mg by mouth daily, Disp: , Rfl:     memantine (NAMENDA) 5 mg tablet, Take 5 mg by mouth daily, Disp: , Rfl:     metFORMIN (GLUCOPHAGE) 500 mg tablet, Take 500 mg by mouth daily with breakfast, Disp: , Rfl:     sertraline (ZOLOFT) 50 mg tablet, Take 50 mg by mouth daily, Disp: , Rfl:     simvastatin (ZOCOR) 40 mg tablet, Take 40 mg by mouth daily at bedtime, Disp: , Rfl:     Allergies   Allergen Reactions    Pollen Extract Hives       Physical Exam  There were no vitals taken for this visit.  Cons: Appears well.  No apparent distress.  Psych: Alert. Oriented x3.  Mood and affect normal.  Eyes: PERRLA, EOMI  Resp: Normal effort.  No audible wheezing or stridor.  CV: Palpable pulse.  No discernable arrhythmia.  No LE edema.  Lymph:  No palpable cervical, axillary, or inguinal lymphadenopathy.  Skin: Warm.  No palpable masses.  No visible lesions.  Neuro: Normal muscle tone.  Normal and symmetric DTR's.    Right Shoulder Exam     Tenderness   The patient is experiencing no tenderness.    Range of Motion   Active abduction:  90     Tests   Drop arm: negative    Other   Erythema: absent  Scars: absent  Sensation: normal  Pulse: present    Comments:    Moderate to severe atrophy noted thenar eminence, first dorsal webspace, supraspinatus, periscapul  Index and long finger flexion contractures              Large joint arthrocentesis  Universal Protocol:  Consent: Verbal consent obtained.  Risks and benefits: risks, benefits and alternatives were discussed  Consent given by: patient  Time out: Immediately prior to procedure a \"time out\" was called to verify the correct patient, procedure, equipment, support staff and site/side marked as required.  Patient understanding: patient states understanding of the procedure being performed  Site marked: the operative site was marked  Patient identity " "confirmed: verbally with patient  Supporting Documentation  Indications: pain and diagnostic evaluation   Procedure Details  Location: shoulder -   Preparation: Patient was prepped and draped in the usual sterile fashion  Needle size: 22 G  Ultrasound guidance: no  Medications administered: 1.5 mL bupivacaine 0.25 %; 9 mg betamethasone acetate-betamethasone sodium phosphate 6 (3-3) mg/mL    Patient tolerance: patient tolerated the procedure well with no immediate complications  Dressing:  Sterile dressing applied              I have personally reviewed pertinent films in PACS and my interpretation is severe glenohumeral osteoarthritis, superior migration of humeral head in relation to the glenoid suggestive of rotator cuff tear .      Scribe Attestation      I,:  Fabiana Aragon am acting as a scribe while in the presence of the attending physician.:       I,:  Endy Dalton DO personally performed the services described in this documentation    as scribed in my presence.:                Portions of the record may have been created with voice recognition software.  Occasional wrong word or \"sound a like\" substitutions may have occurred due to the inherent limitations of voice recognition software.  Read the chart carefully and recognize, using context, where substitutions have occurred.  "

## 2024-06-24 DIAGNOSIS — Z51.5 HOSPICE CARE: Primary | ICD-10-CM

## 2024-06-24 RX ORDER — LORAZEPAM 2 MG/ML
0.5 CONCENTRATE ORAL EVERY 6 HOURS PRN
Qty: 30 ML | Refills: 0 | Status: SHIPPED | OUTPATIENT
Start: 2024-06-24

## 2024-06-24 RX ORDER — MORPHINE SULFATE 100 MG/5ML
5 SOLUTION, CONCENTRATE ORAL
Qty: 30 ML | Refills: 0 | Status: SHIPPED | OUTPATIENT
Start: 2024-06-24

## (undated) DEVICE — INTENDED FOR TISSUE SEPARATION, AND OTHER PROCEDURES THAT REQUIRE A SHARP SURGICAL BLADE TO PUNCTURE OR CUT.: Brand: BARD-PARKER ® CARBON RIB-BACK BLADES

## (undated) DEVICE — SPONGE SCRUB 4 PCT CHLORHEXIDINE

## (undated) DEVICE — KERLIX BANDAGE ROLL: Brand: KERLIX

## (undated) DEVICE — GLOVE SRG BIOGEL ECLIPSE 7

## (undated) DEVICE — GAUZE SPONGES,16 PLY: Brand: CURITY

## (undated) DEVICE — CUFF TOURNIQUET 18 X 4 IN QUICK CONNECT DISP 1 BLADDER

## (undated) DEVICE — STERILE BETHLEHEM PLASTIC HAND: Brand: CARDINAL HEALTH

## (undated) DEVICE — DRESSING TELFA 2 X 3 IN STRL

## (undated) DEVICE — SKIN MARKER DUAL TIP WITH RULER CAP, FLEXIBLE RULER AND LABELS: Brand: DEVON

## (undated) DEVICE — SUT CHROMIC 4-0 RB-1 27 IN U203H

## (undated) DEVICE — GLOVE INDICATOR PI UNDERGLOVE SZ 7 BLUE

## (undated) DEVICE — TELFA NON-ADHERENT ABSORBENT DRESSING: Brand: TELFA